# Patient Record
Sex: MALE | Race: WHITE | NOT HISPANIC OR LATINO | Employment: FULL TIME | URBAN - METROPOLITAN AREA
[De-identification: names, ages, dates, MRNs, and addresses within clinical notes are randomized per-mention and may not be internally consistent; named-entity substitution may affect disease eponyms.]

---

## 2018-01-29 ENCOUNTER — OFFICE VISIT (OUTPATIENT)
Dept: SURGERY | Facility: HOSPITAL | Age: 58
End: 2018-01-29
Payer: COMMERCIAL

## 2018-01-29 VITALS
TEMPERATURE: 99.2 F | WEIGHT: 303.6 LBS | BODY MASS INDEX: 46.01 KG/M2 | DIASTOLIC BLOOD PRESSURE: 100 MMHG | RESPIRATION RATE: 24 BRPM | HEIGHT: 68 IN | SYSTOLIC BLOOD PRESSURE: 154 MMHG | HEART RATE: 80 BPM

## 2018-01-29 DIAGNOSIS — L91.8 SKIN TAGS, MULTIPLE ACQUIRED: ICD-10-CM

## 2018-01-29 DIAGNOSIS — E66.01 MORBID OBESITY (HCC): ICD-10-CM

## 2018-01-29 DIAGNOSIS — L21.9 SEBORRHEA: ICD-10-CM

## 2018-01-29 DIAGNOSIS — Z12.11 ENCOUNTER FOR SCREENING COLONOSCOPY: ICD-10-CM

## 2018-01-29 DIAGNOSIS — D18.01 HEMANGIOMA OF SKIN: ICD-10-CM

## 2018-01-29 DIAGNOSIS — D22.9 MULTIPLE PIGMENTED NEVI: ICD-10-CM

## 2018-01-29 DIAGNOSIS — K42.9 RECURRENT UMBILICAL HERNIA: Primary | ICD-10-CM

## 2018-01-29 PROCEDURE — 99244 OFF/OP CNSLTJ NEW/EST MOD 40: CPT | Performed by: SURGERY

## 2018-01-29 NOTE — PROGRESS NOTES
[address]  [place]  [address]  1960  [phone]  [address]  [facility]GAR[facility] [address]    Chief Complaint   Patient presents with    Hernia     New patient consult to evaluate umbilical hernia       Assessment/Plan      No history exists  History of Present Illness: Clinton Woodard is a 56year old male who presents today, per referral by Dr Abdoulaye Arzate, for consultation regarding a possible umbilical hernia  He reports this is a recurrent hernia  He had an umbilical hernia repair with mesh approximately 10 years ago done by Dr David Denton  He noticed symptoms a couple months ago  It was reducible then, but he reports it is not reducible now  It causes him pain  He reports he has more bowel movements each day  He is morbidly obese with a BMI of 46 16  He reports he has lost 15 pounds  He lost 100 pounds with a medication a few years ago but has since gained it back  He has never had a colonoscopy  He states he has high blood pressure and takes Hyzaar for it  He denies any other past medical or surgical history  Review of Systems   Constitutional: Negative  HENT: Negative  Eyes: Negative  Respiratory: Negative  Cardiovascular: Negative  Gastrointestinal: Positive for abdominal pain  Endocrine: Negative  Genitourinary: Negative  Musculoskeletal: Negative  Skin: Negative  Allergic/Immunologic: Negative  Neurological: Negative  Hematological: Negative  Psychiatric/Behavioral: Negative  All other systems reviewed and are negative  There is no problem list on file for this patient  No past medical history on file  No past surgical history on file  No family history on file    Social History     Social History    Marital status: /Civil Union Spouse name: N/A    Number of children: N/A    Years of education: N/A     Occupational History    Not on file  Social History Main Topics    Smoking status: Never Smoker    Smokeless tobacco: Never Used    Alcohol use No    Drug use: No    Sexual activity: Not on file     Other Topics Concern    Not on file     Social History Narrative    No narrative on file     No current outpatient prescriptions on file  Allergies not on file  Vitals:    01/29/18 1358   BP: 154/100   Pulse: 80   Resp: (!) 24   Temp: 99 2 °F (37 3 °C)       Physical Exam   Constitutional: He is oriented to person, place, and time  He appears well-developed and well-nourished  No distress  Morbidly obese  HENT:   Head: Normocephalic and atraumatic  Right Ear: External ear normal    Left Ear: External ear normal    Nose: Nose normal    Mouth/Throat: Oropharynx is clear and moist  No oropharyngeal exudate  Eyes: Conjunctivae and EOM are normal  Right eye exhibits no discharge  Left eye exhibits no discharge  No scleral icterus  Neck: Normal range of motion  Neck supple  No JVD present  No tracheal deviation present  No thyromegaly present  Cardiovascular: Normal rate, regular rhythm, normal heart sounds and intact distal pulses  Exam reveals no gallop and no friction rub  No murmur heard  Pulmonary/Chest: Effort normal and breath sounds normal  No stridor  No respiratory distress  He has no wheezes  He has no rales  He exhibits no tenderness  Abdominal: Soft  Bowel sounds are normal  He exhibits no distension and no mass  There is no tenderness  There is no rebound and no guarding  Non-reducible umbilical hernia  Large midline scar  Musculoskeletal: Normal range of motion  He exhibits no edema, tenderness or deformity  Lymphadenopathy:     He has no cervical adenopathy  Neurological: He is alert and oriented to person, place, and time  No cranial nerve deficit  Coordination normal    Skin: Skin is dry   No rash noted  He is not diaphoretic  No erythema  No pallor  Multiple skin tags of neck and back  Pigmented nevi, scattered hemangiomas of back  Seborrhea of upper extremities  Psychiatric: He has a normal mood and affect  His behavior is normal  Thought content normal    Nursing note and vitals reviewed  Pathology:  * Cannot find OR log *      Labs:  CBC, Coags, BMP, Mg, Phos   Liver Function, Amylase, & Lipase   Cardiac Enzymes   CSF & Dilantin   ABGs     Imaging  No results found  I reviewed the above laboratory and imaging data  Discussion/Summary: Koko Levin is a 56year old male who presents today, per referral by Dr Lupillo García, for consultation regarding a possible umbilical hernia  Physical exam revealed a non-reducible umbilical hernia  Discussed his weight and weight loss  He understands that his risk of recurrence for a recurrent umbilical hernia repair with his weight risk factor is 50%  Explained that it would be best for him to lose weight before hernia surgery  He could have it done now, but the recurrence risk is high  Discussed gastric bypass surgery as a possible option  Will refer him to Bariatric surgery  He will follow up with us afterwards  He understands the symptoms of incarceration  He will call back if he has any questions or concerns  Skin- Instructed him to have an annual skin exam performed by his PCP or a dermatologist for monitoring of his multiple skin tags of neck and back, pigmented nevi and scattered hemangiomas of back,and seborrhea of upper extremities  Colonoscopy- Recommended he have a colonoscopy

## 2019-10-16 ENCOUNTER — APPOINTMENT (EMERGENCY)
Dept: RADIOLOGY | Facility: HOSPITAL | Age: 59
End: 2019-10-16

## 2019-10-16 ENCOUNTER — HOSPITAL ENCOUNTER (EMERGENCY)
Facility: HOSPITAL | Age: 59
Discharge: HOME | End: 2019-10-16
Attending: EMERGENCY MEDICINE | Admitting: EMERGENCY MEDICINE

## 2019-10-16 VITALS
DIASTOLIC BLOOD PRESSURE: 88 MMHG | WEIGHT: 300 LBS | OXYGEN SATURATION: 99 % | BODY MASS INDEX: 45.61 KG/M2 | RESPIRATION RATE: 22 BRPM | TEMPERATURE: 97.2 F | HEART RATE: 77 BPM | SYSTOLIC BLOOD PRESSURE: 185 MMHG

## 2019-10-16 DIAGNOSIS — S93.601A SPRAIN OF RIGHT FOOT, INITIAL ENCOUNTER: Primary | ICD-10-CM

## 2019-10-16 PROCEDURE — 99282 EMERGENCY DEPT VISIT SF MDM: CPT | Performed by: PHYSICIAN ASSISTANT

## 2019-10-16 PROCEDURE — 73630 X-RAY EXAM OF FOOT: CPT

## 2019-10-16 PROCEDURE — 99283 EMERGENCY DEPT VISIT LOW MDM: CPT

## 2019-10-16 NOTE — ED PROVIDER NOTES
History  Chief Complaint   Patient presents with    Foot Injury     To ED with c/o pain in right foot after twisting it Monday while getting out of the truck,     65 yo male w/ hx of HTN presents to the Emergency Department for evaluation of R foot pain x 2 days  States when he attempted to get out of his truck he stepped on a rock, causing an inversion of the foot  Unable to localize pain, simply states "the whole foot hurts " No ankle or knee pain  Able to bear weight with pain  None       Past Medical History:   Diagnosis Date    Hypertension        Past Surgical History:   Procedure Laterality Date    HERNIA REPAIR      Umbilical hernia repair with mesh       History reviewed  No pertinent family history  I have reviewed and agree with the history as documented  Social History     Tobacco Use    Smoking status: Never Smoker    Smokeless tobacco: Never Used   Substance Use Topics    Alcohol use: No    Drug use: No        Review of Systems   Constitutional: Negative for fever  Musculoskeletal: Positive for arthralgias  Negative for joint swelling and myalgias  Skin: Negative for color change and wound  Neurological: Negative for weakness and numbness  Physical Exam  Physical Exam   Constitutional: He is oriented to person, place, and time  He appears well-developed and well-nourished  No distress  HENT:   Head: Normocephalic and atraumatic  Mouth/Throat: Oropharynx is clear and moist    Eyes: Pupils are equal, round, and reactive to light  No scleral icterus  Neck: No JVD present  Cardiovascular: Normal rate and regular rhythm  Exam reveals no gallop and no friction rub  No murmur heard  Pulmonary/Chest: No respiratory distress  He has no wheezes  He has no rales  Musculoskeletal:        Right ankle: He exhibits normal range of motion, no swelling and no ecchymosis   No lateral malleolus, no medial malleolus, no head of 5th metatarsal and no proximal fibula tenderness found         Right foot: There is bony tenderness (non focal, midfoot and plantar midfoot)  There is no crepitus and no deformity  Neurological: He is alert and oriented to person, place, and time  Skin: Skin is warm and dry  He is not diaphoretic  Vitals reviewed  Vital Signs  ED Triage Vitals [10/16/19 1713]   Temperature Pulse Respirations Blood Pressure SpO2   (!) 97 2 °F (36 2 °C) 77 22 (!) 185/88 99 %      Temp Source Heart Rate Source Patient Position - Orthostatic VS BP Location FiO2 (%)   Tympanic Monitor Sitting Right arm --      Pain Score       7           Vitals:    10/16/19 1713   BP: (!) 185/88   Pulse: 77   Patient Position - Orthostatic VS: Sitting         Visual Acuity      ED Medications  Medications - No data to display    Diagnostic Studies  Results Reviewed     None                 XR foot 3+ views RIGHT    (Results Pending)              Procedures  Procedures       ED Course                               MDM  Number of Diagnoses or Management Options  Sprain of right foot, initial encounter: new and requires workup  Diagnosis management comments: XR unremarkable  Pt ambulatory at discharge with antalgic gait       Amount and/or Complexity of Data Reviewed  Tests in the radiology section of CPT®: ordered and reviewed  Review and summarize past medical records: yes  Independent visualization of images, tracings, or specimens: yes        Disposition  Final diagnoses:   Sprain of right foot, initial encounter     Time reflects when diagnosis was documented in both MDM as applicable and the Disposition within this note     Time User Action Codes Description Comment    10/16/2019  5:47 PM Joe Stark Add [K30 240L] Sprain of right foot, initial encounter       ED Disposition     ED Disposition Condition Date/Time Comment    Discharge Stable Wed Oct 16, 2019  5:47 PM Tawny Ford discharge to home/self care              Follow-up Information     Follow up With Specialties Details Why Yousif, DO Internal Medicine In 3 days If symptoms worsen [address] [facility],[address]  [address] [place] [address] Road  [phone]            There are no discharge medications for this patient  No discharge procedures on file      ED Provider  Electronically Signed by           Jose E Colby PA-C  10/16/19 4752

## 2020-02-06 ENCOUNTER — HOSPITAL ENCOUNTER (INPATIENT)
Facility: HOSPITAL | Age: 60
LOS: 4 days | DRG: 065 | End: 2020-02-10
Attending: EMERGENCY MEDICINE | Admitting: FAMILY MEDICINE
Payer: COMMERCIAL

## 2020-02-06 ENCOUNTER — APPOINTMENT (EMERGENCY)
Dept: CT IMAGING | Facility: HOSPITAL | Age: 60
DRG: 065 | End: 2020-02-06
Payer: COMMERCIAL

## 2020-02-06 DIAGNOSIS — R93.0 RIGHT MAXILLARY SINUS OPACIFICATION: ICD-10-CM

## 2020-02-06 DIAGNOSIS — G45.9 TIA (TRANSIENT ISCHEMIC ATTACK): Primary | ICD-10-CM

## 2020-02-06 DIAGNOSIS — I63.9 CEREBROVASCULAR ACCIDENT (CVA), UNSPECIFIED MECHANISM (HCC): ICD-10-CM

## 2020-02-06 DIAGNOSIS — E11.9 TYPE 2 DIABETES MELLITUS WITHOUT COMPLICATION, UNSPECIFIED WHETHER LONG TERM INSULIN USE (HCC): Chronic | ICD-10-CM

## 2020-02-06 LAB
ALBUMIN SERPL BCP-MCNC: 4.1 G/DL (ref 3.5–5)
ALP SERPL-CCNC: 57 U/L (ref 46–116)
ALT SERPL W P-5'-P-CCNC: 51 U/L (ref 12–78)
ANION GAP SERPL CALCULATED.3IONS-SCNC: 13 MMOL/L (ref 4–13)
AST SERPL W P-5'-P-CCNC: 36 U/L (ref 5–45)
BASOPHILS # BLD AUTO: 0.1 THOUSANDS/ΜL (ref 0–0.1)
BASOPHILS NFR BLD AUTO: 1 % (ref 0–1)
BILIRUB SERPL-MCNC: 1.2 MG/DL (ref 0.2–1)
BUN SERPL-MCNC: 25 MG/DL (ref 5–25)
CALCIUM SERPL-MCNC: 9.6 MG/DL (ref 8.3–10.1)
CHLORIDE SERPL-SCNC: 100 MMOL/L (ref 100–108)
CO2 SERPL-SCNC: 25 MMOL/L (ref 21–32)
CREAT SERPL-MCNC: 1.29 MG/DL (ref 0.6–1.3)
EOSINOPHIL # BLD AUTO: 0.25 THOUSAND/ΜL (ref 0–0.61)
EOSINOPHIL NFR BLD AUTO: 2 % (ref 0–6)
ERYTHROCYTE [DISTWIDTH] IN BLOOD BY AUTOMATED COUNT: 13.4 % (ref 11.6–15.1)
GFR SERPL CREATININE-BSD FRML MDRD: 61 ML/MIN/1.73SQ M
GLUCOSE SERPL-MCNC: 155 MG/DL (ref 65–140)
HCT VFR BLD AUTO: 54.1 % (ref 36.5–49.3)
HGB BLD-MCNC: 17.9 G/DL (ref 12–17)
IMM GRANULOCYTES # BLD AUTO: 0.1 THOUSAND/UL (ref 0–0.2)
IMM GRANULOCYTES NFR BLD AUTO: 1 % (ref 0–2)
LYMPHOCYTES # BLD AUTO: 3.05 THOUSANDS/ΜL (ref 0.6–4.47)
LYMPHOCYTES NFR BLD AUTO: 22 % (ref 14–44)
MCH RBC QN AUTO: 29.7 PG (ref 26.8–34.3)
MCHC RBC AUTO-ENTMCNC: 33.1 G/DL (ref 31.4–37.4)
MCV RBC AUTO: 90 FL (ref 82–98)
MONOCYTES # BLD AUTO: 1.04 THOUSAND/ΜL (ref 0.17–1.22)
MONOCYTES NFR BLD AUTO: 8 % (ref 4–12)
NEUTROPHILS # BLD AUTO: 9.06 THOUSANDS/ΜL (ref 1.85–7.62)
NEUTS SEG NFR BLD AUTO: 66 % (ref 43–75)
NRBC BLD AUTO-RTO: 0 /100 WBCS
PLATELET # BLD AUTO: 297 THOUSANDS/UL (ref 149–390)
PMV BLD AUTO: 10 FL (ref 8.9–12.7)
POTASSIUM SERPL-SCNC: 3.7 MMOL/L (ref 3.5–5.3)
PROT SERPL-MCNC: 9.2 G/DL (ref 6.4–8.2)
RBC # BLD AUTO: 6.02 MILLION/UL (ref 3.88–5.62)
SODIUM SERPL-SCNC: 138 MMOL/L (ref 136–145)
TROPONIN I SERPL-MCNC: <0.02 NG/ML
WBC # BLD AUTO: 13.6 THOUSAND/UL (ref 4.31–10.16)

## 2020-02-06 PROCEDURE — 96360 HYDRATION IV INFUSION INIT: CPT

## 2020-02-06 PROCEDURE — 99285 EMERGENCY DEPT VISIT HI MDM: CPT

## 2020-02-06 PROCEDURE — 70450 CT HEAD/BRAIN W/O DYE: CPT

## 2020-02-06 PROCEDURE — 85025 COMPLETE CBC W/AUTO DIFF WBC: CPT

## 2020-02-06 PROCEDURE — 93005 ELECTROCARDIOGRAM TRACING: CPT

## 2020-02-06 PROCEDURE — 84484 ASSAY OF TROPONIN QUANT: CPT

## 2020-02-06 PROCEDURE — 96361 HYDRATE IV INFUSION ADD-ON: CPT

## 2020-02-06 PROCEDURE — 80053 COMPREHEN METABOLIC PANEL: CPT

## 2020-02-06 PROCEDURE — 36415 COLL VENOUS BLD VENIPUNCTURE: CPT

## 2020-02-06 PROCEDURE — 99285 EMERGENCY DEPT VISIT HI MDM: CPT | Performed by: EMERGENCY MEDICINE

## 2020-02-06 RX ORDER — ASPIRIN 81 MG/1
81 TABLET, CHEWABLE ORAL DAILY
Status: DISCONTINUED | OUTPATIENT
Start: 2020-02-07 | End: 2020-02-10 | Stop reason: HOSPADM

## 2020-02-06 RX ORDER — MECLIZINE HCL 12.5 MG/1
25 TABLET ORAL ONCE
Status: COMPLETED | OUTPATIENT
Start: 2020-02-06 | End: 2020-02-06

## 2020-02-06 RX ORDER — ASPIRIN 325 MG
325 TABLET ORAL ONCE
Status: COMPLETED | OUTPATIENT
Start: 2020-02-06 | End: 2020-02-06

## 2020-02-06 RX ORDER — LORAZEPAM 2 MG/ML
1 INJECTION INTRAMUSCULAR ONCE
Status: COMPLETED | OUTPATIENT
Start: 2020-02-06 | End: 2020-02-06

## 2020-02-06 RX ORDER — ONDANSETRON 2 MG/ML
1 INJECTION INTRAMUSCULAR; INTRAVENOUS ONCE
Status: COMPLETED | OUTPATIENT
Start: 2020-02-06 | End: 2020-02-06

## 2020-02-06 RX ADMIN — MECLIZINE 25 MG: 12.5 TABLET ORAL at 19:47

## 2020-02-06 RX ADMIN — ASPIRIN 325 MG ORAL TABLET 325 MG: 325 PILL ORAL at 23:17

## 2020-02-06 RX ADMIN — SODIUM CHLORIDE 1000 ML: 0.9 INJECTION, SOLUTION INTRAVENOUS at 19:47

## 2020-02-07 ENCOUNTER — APPOINTMENT (INPATIENT)
Dept: NON INVASIVE DIAGNOSTICS | Facility: HOSPITAL | Age: 60
DRG: 065 | End: 2020-02-07
Payer: COMMERCIAL

## 2020-02-07 ENCOUNTER — APPOINTMENT (INPATIENT)
Dept: MRI IMAGING | Facility: HOSPITAL | Age: 60
DRG: 065 | End: 2020-02-07
Payer: COMMERCIAL

## 2020-02-07 ENCOUNTER — APPOINTMENT (INPATIENT)
Dept: CT IMAGING | Facility: HOSPITAL | Age: 60
DRG: 065 | End: 2020-02-07
Payer: COMMERCIAL

## 2020-02-07 PROBLEM — I10 HYPERTENSION: Status: ACTIVE | Noted: 2020-02-07

## 2020-02-07 PROBLEM — E66.01 MORBID OBESITY WITH BODY MASS INDEX OF 40.0-49.9 (HCC): Status: ACTIVE | Noted: 2020-02-07

## 2020-02-07 PROBLEM — R42 DIZZINESS: Status: ACTIVE | Noted: 2020-02-07

## 2020-02-07 PROBLEM — I63.9 STROKE (HCC): Status: ACTIVE | Noted: 2020-02-07

## 2020-02-07 PROBLEM — G45.9 TIA (TRANSIENT ISCHEMIC ATTACK): Status: ACTIVE | Noted: 2020-02-07

## 2020-02-07 PROBLEM — E11.9 DIABETES MELLITUS (HCC): Status: ACTIVE | Noted: 2020-02-07

## 2020-02-07 LAB
ANION GAP SERPL CALCULATED.3IONS-SCNC: 9 MMOL/L (ref 4–13)
ATRIAL RATE: 82 BPM
BASOPHILS # BLD AUTO: 0.08 THOUSANDS/ΜL (ref 0–0.1)
BASOPHILS NFR BLD AUTO: 1 % (ref 0–1)
BUN SERPL-MCNC: 24 MG/DL (ref 5–25)
CALCIUM SERPL-MCNC: 8.6 MG/DL (ref 8.3–10.1)
CHLORIDE SERPL-SCNC: 102 MMOL/L (ref 100–108)
CHOLEST SERPL-MCNC: 146 MG/DL (ref 50–200)
CO2 SERPL-SCNC: 27 MMOL/L (ref 21–32)
CREAT SERPL-MCNC: 1.1 MG/DL (ref 0.6–1.3)
EOSINOPHIL # BLD AUTO: 0.16 THOUSAND/ΜL (ref 0–0.61)
EOSINOPHIL NFR BLD AUTO: 2 % (ref 0–6)
ERYTHROCYTE [DISTWIDTH] IN BLOOD BY AUTOMATED COUNT: 13.6 % (ref 11.6–15.1)
EST. AVERAGE GLUCOSE BLD GHB EST-MCNC: 203 MG/DL
GFR SERPL CREATININE-BSD FRML MDRD: 74 ML/MIN/1.73SQ M
GLUCOSE SERPL-MCNC: 123 MG/DL (ref 65–140)
GLUCOSE SERPL-MCNC: 135 MG/DL (ref 65–140)
GLUCOSE SERPL-MCNC: 173 MG/DL (ref 65–140)
GLUCOSE SERPL-MCNC: 205 MG/DL (ref 65–140)
HBA1C MFR BLD: 8.7 % (ref 4.2–6.3)
HCT VFR BLD AUTO: 49.4 % (ref 36.5–49.3)
HDLC SERPL-MCNC: 43 MG/DL
HGB BLD-MCNC: 16.3 G/DL (ref 12–17)
IMM GRANULOCYTES # BLD AUTO: 0.05 THOUSAND/UL (ref 0–0.2)
IMM GRANULOCYTES NFR BLD AUTO: 1 % (ref 0–2)
LDLC SERPL CALC-MCNC: 78 MG/DL (ref 0–100)
LYMPHOCYTES # BLD AUTO: 2.16 THOUSANDS/ΜL (ref 0.6–4.47)
LYMPHOCYTES NFR BLD AUTO: 20 % (ref 14–44)
MAGNESIUM SERPL-MCNC: 1.8 MG/DL (ref 1.6–2.6)
MCH RBC QN AUTO: 30.1 PG (ref 26.8–34.3)
MCHC RBC AUTO-ENTMCNC: 33 G/DL (ref 31.4–37.4)
MCV RBC AUTO: 91 FL (ref 82–98)
MONOCYTES # BLD AUTO: 0.77 THOUSAND/ΜL (ref 0.17–1.22)
MONOCYTES NFR BLD AUTO: 7 % (ref 4–12)
NEUTROPHILS # BLD AUTO: 7.77 THOUSANDS/ΜL (ref 1.85–7.62)
NEUTS SEG NFR BLD AUTO: 69 % (ref 43–75)
NRBC BLD AUTO-RTO: 0 /100 WBCS
P AXIS: 53 DEGREES
PHOSPHATE SERPL-MCNC: 2.6 MG/DL (ref 2.7–4.5)
PLATELET # BLD AUTO: 230 THOUSANDS/UL (ref 149–390)
PMV BLD AUTO: 9.7 FL (ref 8.9–12.7)
POTASSIUM SERPL-SCNC: 4 MMOL/L (ref 3.5–5.3)
PR INTERVAL: 188 MS
QRS AXIS: -72 DEGREES
QRSD INTERVAL: 114 MS
QT INTERVAL: 404 MS
QTC INTERVAL: 472 MS
RBC # BLD AUTO: 5.42 MILLION/UL (ref 3.88–5.62)
SODIUM SERPL-SCNC: 138 MMOL/L (ref 136–145)
T WAVE AXIS: 85 DEGREES
TRIGL SERPL-MCNC: 123 MG/DL
VENTRICULAR RATE: 82 BPM
WBC # BLD AUTO: 10.99 THOUSAND/UL (ref 4.31–10.16)

## 2020-02-07 PROCEDURE — 80061 LIPID PANEL: CPT | Performed by: PHYSICIAN ASSISTANT

## 2020-02-07 PROCEDURE — 70498 CT ANGIOGRAPHY NECK: CPT

## 2020-02-07 PROCEDURE — 97163 PT EVAL HIGH COMPLEX 45 MIN: CPT

## 2020-02-07 PROCEDURE — 97166 OT EVAL MOD COMPLEX 45 MIN: CPT

## 2020-02-07 PROCEDURE — 82948 REAGENT STRIP/BLOOD GLUCOSE: CPT

## 2020-02-07 PROCEDURE — 93306 TTE W/DOPPLER COMPLETE: CPT | Performed by: INTERNAL MEDICINE

## 2020-02-07 PROCEDURE — 99233 SBSQ HOSP IP/OBS HIGH 50: CPT | Performed by: NURSE PRACTITIONER

## 2020-02-07 PROCEDURE — 70551 MRI BRAIN STEM W/O DYE: CPT

## 2020-02-07 PROCEDURE — 93010 ELECTROCARDIOGRAM REPORT: CPT | Performed by: INTERNAL MEDICINE

## 2020-02-07 PROCEDURE — 93306 TTE W/DOPPLER COMPLETE: CPT

## 2020-02-07 PROCEDURE — 99223 1ST HOSP IP/OBS HIGH 75: CPT | Performed by: INTERNAL MEDICINE

## 2020-02-07 PROCEDURE — 83735 ASSAY OF MAGNESIUM: CPT | Performed by: PHYSICIAN ASSISTANT

## 2020-02-07 PROCEDURE — 99255 IP/OBS CONSLTJ NEW/EST HI 80: CPT | Performed by: PSYCHIATRY & NEUROLOGY

## 2020-02-07 PROCEDURE — 83036 HEMOGLOBIN GLYCOSYLATED A1C: CPT | Performed by: PHYSICIAN ASSISTANT

## 2020-02-07 PROCEDURE — 70496 CT ANGIOGRAPHY HEAD: CPT

## 2020-02-07 PROCEDURE — 80048 BASIC METABOLIC PNL TOTAL CA: CPT | Performed by: PHYSICIAN ASSISTANT

## 2020-02-07 PROCEDURE — 85025 COMPLETE CBC W/AUTO DIFF WBC: CPT | Performed by: PHYSICIAN ASSISTANT

## 2020-02-07 PROCEDURE — 84100 ASSAY OF PHOSPHORUS: CPT | Performed by: PHYSICIAN ASSISTANT

## 2020-02-07 RX ORDER — CHLORHEXIDINE GLUCONATE 0.12 MG/ML
15 RINSE ORAL EVERY 12 HOURS SCHEDULED
Status: DISCONTINUED | OUTPATIENT
Start: 2020-02-07 | End: 2020-02-08

## 2020-02-07 RX ORDER — AMLODIPINE BESYLATE 5 MG/1
10 TABLET ORAL DAILY
Status: DISCONTINUED | OUTPATIENT
Start: 2020-02-08 | End: 2020-02-10 | Stop reason: HOSPADM

## 2020-02-07 RX ORDER — BUPROPION HYDROCHLORIDE 300 MG/1
300 TABLET ORAL DAILY
Status: DISCONTINUED | OUTPATIENT
Start: 2020-02-07 | End: 2020-02-10 | Stop reason: HOSPADM

## 2020-02-07 RX ORDER — HYDROCHLOROTHIAZIDE 25 MG/1
25 TABLET ORAL DAILY
Status: DISCONTINUED | OUTPATIENT
Start: 2020-02-08 | End: 2020-02-10 | Stop reason: HOSPADM

## 2020-02-07 RX ORDER — LOSARTAN POTASSIUM 25 MG/1
25 TABLET ORAL DAILY
Status: DISCONTINUED | OUTPATIENT
Start: 2020-02-08 | End: 2020-02-10 | Stop reason: HOSPADM

## 2020-02-07 RX ORDER — LORAZEPAM 1 MG/1
1 TABLET ORAL ONCE
Status: COMPLETED | OUTPATIENT
Start: 2020-02-07 | End: 2020-02-07

## 2020-02-07 RX ORDER — ATORVASTATIN CALCIUM 40 MG/1
40 TABLET, FILM COATED ORAL
Status: DISCONTINUED | OUTPATIENT
Start: 2020-02-07 | End: 2020-02-10 | Stop reason: HOSPADM

## 2020-02-07 RX ORDER — HYDROCHLOROTHIAZIDE 25 MG/1
25 TABLET ORAL DAILY
Status: ON HOLD | COMMUNITY
End: 2021-11-04 | Stop reason: SDDI

## 2020-02-07 RX ORDER — MECLIZINE HCL 12.5 MG/1
25 TABLET ORAL EVERY 8 HOURS PRN
Status: DISCONTINUED | OUTPATIENT
Start: 2020-02-07 | End: 2020-02-08

## 2020-02-07 RX ORDER — LOSARTAN POTASSIUM 100 MG/1
25 TABLET ORAL DAILY
Status: ON HOLD | COMMUNITY
End: 2021-11-04 | Stop reason: SDDI

## 2020-02-07 RX ORDER — BUPROPION HYDROCHLORIDE 300 MG/1
300 TABLET ORAL DAILY
Status: ON HOLD | COMMUNITY
End: 2021-11-04 | Stop reason: SDDI

## 2020-02-07 RX ORDER — HYDRALAZINE HYDROCHLORIDE 20 MG/ML
10 INJECTION INTRAMUSCULAR; INTRAVENOUS EVERY 6 HOURS PRN
Status: DISCONTINUED | OUTPATIENT
Start: 2020-02-07 | End: 2020-02-10 | Stop reason: HOSPADM

## 2020-02-07 RX ORDER — AMLODIPINE BESYLATE 10 MG/1
10 TABLET ORAL DAILY
Status: ON HOLD | COMMUNITY
End: 2021-11-04 | Stop reason: SDDI

## 2020-02-07 RX ORDER — ACETAMINOPHEN 325 MG/1
650 TABLET ORAL EVERY 6 HOURS PRN
Status: DISCONTINUED | OUTPATIENT
Start: 2020-02-07 | End: 2020-02-10 | Stop reason: HOSPADM

## 2020-02-07 RX ORDER — ONDANSETRON 2 MG/ML
4 INJECTION INTRAMUSCULAR; INTRAVENOUS EVERY 6 HOURS PRN
Status: DISCONTINUED | OUTPATIENT
Start: 2020-02-07 | End: 2020-02-10 | Stop reason: HOSPADM

## 2020-02-07 RX ADMIN — SERTRALINE HYDROCHLORIDE 50 MG: 50 TABLET ORAL at 11:54

## 2020-02-07 RX ADMIN — PERFLUTREN 1 ML/MIN: 6.52 INJECTION, SUSPENSION INTRAVENOUS at 12:00

## 2020-02-07 RX ADMIN — IOHEXOL 100 ML: 350 INJECTION, SOLUTION INTRAVENOUS at 18:27

## 2020-02-07 RX ADMIN — INSULIN LISPRO 4 UNITS: 100 INJECTION, SOLUTION INTRAVENOUS; SUBCUTANEOUS at 13:12

## 2020-02-07 RX ADMIN — ASPIRIN 81 MG 81 MG: 81 TABLET ORAL at 08:41

## 2020-02-07 RX ADMIN — ACETAMINOPHEN 650 MG: 325 TABLET ORAL at 17:16

## 2020-02-07 RX ADMIN — CHLORHEXIDINE GLUCONATE 0.12% ORAL RINSE 15 ML: 1.2 LIQUID ORAL at 20:06

## 2020-02-07 RX ADMIN — MECLIZINE 25 MG: 12.5 TABLET ORAL at 11:55

## 2020-02-07 RX ADMIN — ENOXAPARIN SODIUM 40 MG: 40 INJECTION SUBCUTANEOUS at 08:41

## 2020-02-07 RX ADMIN — HYDRALAZINE HYDROCHLORIDE 10 MG: 20 INJECTION INTRAMUSCULAR; INTRAVENOUS at 20:06

## 2020-02-07 RX ADMIN — BUPROPION HYDROCHLORIDE 300 MG: 300 TABLET, FILM COATED, EXTENDED RELEASE ORAL at 11:54

## 2020-02-07 RX ADMIN — ATORVASTATIN CALCIUM 40 MG: 40 TABLET, FILM COATED ORAL at 16:35

## 2020-02-07 RX ADMIN — LORAZEPAM 1 MG: 1 TABLET ORAL at 13:28

## 2020-02-07 NOTE — SOCIAL WORK
LOS: 1  Patient is not a 30 day re admission or a Medicare Bundled patient  Met with patient to review the discharge planning process including identifying help at home and patient preference for discharge  Patient reports residing alone in a 1st floor apartment with 1 [address]  He is independent of ADL's, using no assistive device  He is employed full time and owns apartment buildings  He reports no POA/AD and is not interested in obtaining information from the CM on POA/AD  Amanda Varela He denies   SNF/BHU/D&A rehab admissions  He uses [employer] for his medication and can afford them  Discussed his need for acute inpatient rehab, [place] [facility] given and he is requesting referrals be sent to [address] [place] [address]  Referrals sent via [facility]  Wait bed availability

## 2020-02-07 NOTE — ED NOTES
Pts mother is requesting to be called with any changes        Stan Carlisle  [phone]     Karely Bone  02/06/20 2890

## 2020-02-07 NOTE — PROGRESS NOTES
Per MD orders patient transferred to ICU, report provided  Belongings packed and sent along with the patient

## 2020-02-07 NOTE — PLAN OF CARE
Problem: Neurological Deficit  Goal: Neurological status is stable or improving  Description  Interventions:  - Monitor and assess patient's level of consciousness, motor function, sensory function, and level of assistance needed for ADLs  - Monitor and report changes from baseline  Collaborate with interdisciplinary team to initiate plan and implement interventions as ordered  - Provide and maintain a safe environment  - Consider seizure precautions  - Consider fall precautions  - Consider aspiration precautions  - Consider bleeding precautions  Outcome: Progressing     Problem: Activity Intolerance/Impaired Mobility  Goal: Mobility/activity is maintained at optimum level for patient  Description  Interventions:  - Assess and monitor patient  barriers to mobility and need for assistive/adaptive devices  - Assess patient's emotional response to limitations  - Collaborate with interdisciplinary team and initiate plans and interventions as ordered  - Encourage independent activity per ability   - Maintain proper body alignment  - Perform active/passive rom as tolerated/ordered  - Plan activities to conserve energy   - Turn patient as appropriate  Outcome: Progressing     Problem: Communication Impairment  Goal: Ability to express needs and understand communication  Description  Assess patient's communication skills and ability to understand information  Patient will demonstrate use of effective communication techniques, alternative methods of communication and understanding even if not able to speak  - Encourage communication and provide alternate methods of communication as needed  - Collaborate with case management/ for discharge needs  - Include patient/family/caregiver in decisions related to communication    Outcome: Progressing     Problem: Potential for Aspiration  Goal: Non-ventilated patient's risk of aspiration is minimized  Description  Assess and monitor vital signs, respiratory status, and labs (WBC)  Monitor for signs of aspiration (tachypnea, cough, rales, wheezing, cyanosis, fever)  - Assess and monitor patient's ability to swallow  - Place patient up in chair to eat if possible  - HOB up at 90 degrees to eat if unable to get patient up into chair   - Supervise patient during oral intake  - Instruct patient/ family to take small bites  - Instruct patient/ family to take small single sips when taking liquids  - Follow patient-specific strategies generated by speech pathologist   Outcome: Progressing  Goal: Ventilated patient's risk of aspiration is minimized  Description  Assess and monitor vital signs, respiratory status, airway cuff pressure, and labs (WBC)  Monitor for signs of aspiration (tachypnea, cough, rales, wheezing, cyanosis, fever)  - Elevate head of bed 30 degrees if patient has tube feeding   - Monitor tube feeding  Outcome: Progressing     Problem: Nutrition  Goal: Nutrition/Hydration status is improving  Description  Monitor and assess patient's nutrition/hydration status for malnutrition (ex- brittle hair, bruises, dry skin, pale skin and conjunctiva, muscle wasting, smooth red tongue, and disorientation)  Collaborate with interdisciplinary team and initiate plan and interventions as ordered  Monitor patient's weight and dietary intake as ordered or per policy  Utilize nutrition screening tool and intervene per policy  Determine patient's food preferences and provide high-protein, high-caloric foods as appropriate  - Assist patient with eating   - Allow adequate time for meals   - Encourage patient to take dietary supplement as ordered  - Collaborate with clinical nutritionist   - Include patient/family/caregiver in decisions related to nutrition    Outcome: Progressing     Problem: PAIN - ADULT  Goal: Verbalizes/displays adequate comfort level or baseline comfort level  Description  Interventions:  - Encourage patient to monitor pain and request assistance  - Assess pain using appropriate pain scale  - Administer analgesics based on type and severity of pain and evaluate response  - Implement non-pharmacological measures as appropriate and evaluate response  - Consider cultural and social influences on pain and pain management  - Notify physician/advanced practitioner if interventions unsuccessful or patient reports new pain  Outcome: Progressing     Problem: INFECTION - ADULT  Goal: Absence or prevention of progression during hospitalization  Description  INTERVENTIONS:  - Assess and monitor for signs and symptoms of infection  - Monitor lab/diagnostic results  - Monitor all insertion sites, i e  indwelling lines, tubes, and drains  - Monitor endotracheal if appropriate and nasal secretions for changes in amount and color  - [place] appropriate cooling/warming therapies per order  - Administer medications as ordered  - Instruct and encourage patient and family to use good hand hygiene technique  - Identify and instruct in appropriate isolation precautions for identified infection/condition  Outcome: Progressing  Goal: Absence of fever/infection during neutropenic period  Description  INTERVENTIONS:  - Monitor WBC    Outcome: Progressing     Problem: SAFETY ADULT  Goal: Patient will remain free of falls  Description  INTERVENTIONS:  - Assess patient frequently for physical needs  -  Identify cognitive and physical deficits and behaviors that affect risk of falls    -  [place] fall precautions as indicated by assessment   - Educate patient/family on patient safety including physical limitations  - Instruct patient to call for assistance with activity based on assessment  - Modify environment to reduce risk of injury  - Consider OT/PT consult to assist with strengthening/mobility  Outcome: Progressing  Goal: Maintain or return to baseline ADL function  Description  INTERVENTIONS:  -  Assess patient's ability to carry out ADLs; assess patient's baseline for ADL function and identify physical deficits which impact ability to perform ADLs (bathing, care of mouth/teeth, toileting, grooming, dressing, etc )  - Assess/evaluate cause of self-care deficits   - Assess range of motion  - Assess patient's mobility; develop plan if impaired  - Assess patient's need for assistive devices and provide as appropriate  - Encourage maximum independence but intervene and supervise when necessary  - Involve family in performance of ADLs  - Assess for home care needs following discharge   - Consider OT consult to assist with ADL evaluation and planning for discharge  - Provide patient education as appropriate  Outcome: Progressing  Goal: Maintain or return mobility status to optimal level  Description  INTERVENTIONS:  - Assess patient's baseline mobility status (ambulation, transfers, stairs, etc )    - Identify cognitive and physical deficits and behaviors that affect mobility  - Identify mobility aids required to assist with transfers and/or ambulation (gait belt, sit-to-stand, lift, walker, cane, etc )  - [place] fall precautions as indicated by assessment  - Record patient progress and toleration of activity level on Mobility SBAR; progress patient to next Phase/Stage  - Instruct patient to call for assistance with activity based on assessment  - Consider rehabilitation consult to assist with strengthening/weightbearing, etc   Outcome: Progressing     Problem: DISCHARGE PLANNING  Goal: Discharge to home or other facility with appropriate resources  Description  INTERVENTIONS:  - Identify barriers to discharge w/patient and caregiver  - Arrange for needed discharge resources and transportation as appropriate  - Identify discharge learning needs (meds, wound care, etc )  - Arrange for interpretive services to assist at discharge as needed  - Refer to Case Management Department for coordinating discharge planning if the patient needs post-hospital services based on physician/advanced practitioner order or complex needs related to functional status, cognitive ability, or social support system  Outcome: Progressing     Problem: Knowledge Deficit  Goal: Patient/family/caregiver demonstrates understanding of disease process, treatment plan, medications, and discharge instructions  Description  Complete learning assessment and assess knowledge base    Interventions:  - Provide teaching at level of understanding  - Provide teaching via preferred learning methods  Outcome: Progressing     Problem: CARDIOVASCULAR - ADULT  Goal: Maintains optimal cardiac output and hemodynamic stability  Description  INTERVENTIONS:  - Monitor I/O, vital signs and rhythm  - Monitor for S/S and trends of decreased cardiac output  - Administer and titrate ordered vasoactive medications to optimize hemodynamic stability  - Assess quality of pulses, skin color and temperature  - Assess for signs of decreased coronary artery perfusion  - Instruct patient to report change in severity of symptoms  Outcome: Progressing  Goal: Absence of cardiac dysrhythmias or at baseline rhythm  Description  INTERVENTIONS:  - Continuous cardiac monitoring, vital signs, obtain 12 lead EKG if ordered  - Administer antiarrhythmic and heart rate control medications as ordered  - Monitor electrolytes and administer replacement therapy as ordered  Outcome: Progressing

## 2020-02-07 NOTE — UTILIZATION REVIEW
Initial Clinical Review    Admission: Date/Time/Statement: Admission Orders (From admission, onward)     Ordered        02/06/20 2302  Inpatient Admission  Once                   Orders Placed This Encounter   Procedures    Inpatient Admission     Standing Status:   Standing     Number of Occurrences:   1     Order Specific Question:   Admitting Physician     Answer:   Prabha Bowden [[id number]]     Order Specific Question:   Level of Care     Answer:   Med Surg [[id number]]     Order Specific Question:   Estimated length of stay     Answer:   More than 2 Midnights     Order Specific Question:   Certification     Answer:   I certify that inpatient services are medically necessary for this patient for a duration of greater than two midnights  See H&P and MD Progress Notes for additional information about the patient's course of treatment  ED Arrival Information     Expected Arrival Acuity Means of Arrival Escorted By Service Admission Type    - 2/6/2020 18:28 Urgent Ambulance [facility]) Hospitalist Urgent    Arrival Complaint    Dizziness        Chief Complaint   Patient presents with    Dizziness     c/o dizziness and nausea x 2 days  1mg ativan, 4mg zofran via EMS     Assessment/Plan: this is a 64year old male from home to ED via ems admitted to inpatient due to TIA/dizziness  History of hypertension, sinus issues, diabetes  Presented due to dizziness, room spinning sensation, worse when turns head or sits up for last 2 days  Since noon feels as does not have control of left arm  Did not take BP medications due to dizziness  Ems administered Zofran and ativan without effect on dizziness  On exam has bilateral nystagmus  Coordination abnormal LUE with finger to nose exam  NIH stoke score 1  Not a tpa candidate  Glucose 155     wbc 13 60  H&H 17 9/54  1  CT showed chronic left caudate lacunar infarction  Serial neuro exams, MRI brain in progress    Neurology consulted    On 2/7- continues to feel weak in LUE, dizzy when sits up  when asleep desats to 88 and snores significantly  Valora Lovings continues Lipitor added, Antivert prn  Permissive hypertension  Per neurology on 2/7- examined by Attending neurologist who noted dysconjugate gaze, nystagmus and worsening diplopia  Etiology concern for posterior fossa infarct  Recommend patient be transferred ICU for closer monitoring and  Q1 hour neuro checks    ED Triage Vitals [02/06/20 1833]   Temperature Pulse Respirations Blood Pressure SpO2   99 2 °F (37 3 °C) 88 (!) 30 146/85 91 %      Temp Source Heart Rate Source Patient Position - Orthostatic VS BP Location FiO2 (%)   Temporal Monitor Lying Right arm --      Pain Score       6        Wt Readings from Last 1 Encounters:   02/07/20 136 kg (299 lb)     Additional Vital Signs:  02/07/20 0800  98 °F (36 7 °C)  75  18  156/104Abnormal     93 %    Lying   02/07/20 0600  98 4 °F (36 9 °C)    19  110/104Abnormal       Nasal cannula  Lying   02/07/20 0400  98 6 °F (37 °C)  87  19  121/74        Lying   02/07/20 0300  98 5 °F (36 9 °C)    20  119/72        Lying   02/07/20 0200  98 4 °F (36 9 °C)    22  132/80        Lying   02/07/20 0100  98 7 °F (37 1 °C)    23Abnormal   147/91      None (Room air)  Lying   02/06/20 23:51:05  98 3 °F (36 8 °C)  90  22  155/94  114  93 %       02/06/20 2230    80  21  156/81    91 %  None (Room air)  Lying   02/06/20 2100    87  23Abnormal   182/87Abnormal     93 %  None (Room air)       Date and Time Eye Opening Best Verbal Response Best Motor Response [place] Coma Scale Score   02/07/20 1000 4 5 6 15   02/07/20 0800 4 5 6 15   02/07/20 0100 4 5 6 15   02/07/20 0000 4 5 6 15   02/06/20 1841 4 5 6 15     Pertinent Labs/Diagnostic Test Results:   2/6/2020 CT head - No acute intracranial abnormality   Findings most consistent with precocious microangiopathic change and chronic left caudate head lacunar infarctions    2/6/2020 EKG- sinus  LA[facility]    Normal ST and T  Results from last 7 days   Lab Units 02/07/20  0444 02/06/20  1843   WBC Thousand/uL 10 99* 13 60*   HEMOGLOBIN g/dL 16 3 17 9*   HEMATOCRIT % 49 4* 54 1*   PLATELETS Thousands/uL 230 297   NEUTROS ABS Thousands/µL 7 77* 9 06*         Results from last 7 days   Lab Units 02/07/20  0444 02/06/20  1843   SODIUM mmol/L 138 138   POTASSIUM mmol/L 4 0 3 7   CHLORIDE mmol/L 102 100   CO2 mmol/L 27 25   ANION GAP mmol/L 9 13   BUN mg/dL 24 25   CREATININE mg/dL 1 10 1 29   EGFR ml/min/1 73sq m 74 61   CALCIUM mg/dL 8 6 9 6   MAGNESIUM mg/dL 1 8  --    PHOSPHORUS mg/dL 2 6*  --      Results from last 7 days   Lab Units 02/06/20  1843   AST U/L 36   ALT U/L 51   ALK PHOS U/L 57   TOTAL PROTEIN g/dL 9 2*   ALBUMIN g/dL 4 1   TOTAL BILIRUBIN mg/dL 1 20*     Results from last 7 days   Lab Units 02/07/20  0444 02/06/20  1843   GLUCOSE RANDOM mg/dL 173* 155*     Results from last 7 days   Lab Units 02/07/20  0444   HEMOGLOBIN A1C % 8 7*   EAG mg/dl 203     Results from last 7 days   Lab Units 02/06/20  1843   TROPONIN I ng/mL <0 02     ED Treatment:   Medication Administration from 02/06/2020 1828 to 02/06/2020 2331       Date/Time Order Dose Route Action Action by Comments     02/06/2020 1904 LORazepam (FOR EMS ONLY) (ATIVAN) 2 mg/mL injection 2 mg 0 mg Does not apply Given to EMS Priyanka Byrnes RN      02/06/2020 1904 ondansetron (FOR EMS ONLY) (ZOFRAN) 4 mg/2 mL injection 4 mg 0 mg Does not apply Given to 66 Michela San RN      02/06/2020 1947 sodium chloride 0 9 % bolus 1,000 mL 1,000 mL Intravenous New Bag Genaro Kauffman RN      02/06/2020 1947 meclizine (ANTIVERT) tablet 25 mg 25 mg Oral Given Genaro Kauffman RN      02/06/2020 2317 aspirin tablet 325 mg 325 mg Oral Given Genaro Kauffman RN         Past Medical History:   Diagnosis Date    Diabetes mellitus ([address] )     Diverticulitis of intestine     Hypertension      Present on Admission:   Dizziness   TIA (transient ischemic attack)   Hypertension      Admitting Diagnosis: TIA (transient ischemic attack) [G45 9]  Dizziness [R42]  Age/Sex: 64 y o  male  Admission Orders: 2/6/2020 2302 Inpatient   Scheduled Medications:  Medications:  [START ON 2/8/2020] amLODIPine 10 mg Oral Daily   aspirin 81 mg Oral Daily   atorvastatin 40 mg Oral Daily With Dinner   buPROPion 300 mg Oral Daily   enoxaparin 40 mg Subcutaneous Daily   [START ON 2/8/2020] hydrochlorothiazide 25 mg Oral Daily   insulin lispro 2-12 Units Subcutaneous TID AC   [START ON 2/8/2020] losartan 25 mg Oral Daily   sertraline 50 mg Oral Daily     Continuous IV Infusions: none     PRN Meds:  acetaminophen 650 mg Oral Q6H PRN   hydrALAZINE 10 mg Intravenous Q6H PRN   Meclizine - used x 1  25 mg Oral Q8H PRN   ondansetron 4 mg Intravenous Q6H PRN       IP CONSULT TO PHYSICAL MEDICINE REHAB  IP CONSULT TO NEUROLOGY    telemetry      Network Utilization Review Department  [email or web address]  ATTENTION: Please call with any questions or concerns to [phone] and carefully listen to the prompts so that you are directed to the right person  All voicemails are confidential   [place] all requests for admission clinical reviews, approved or denied determinations and any other requests to dedicated fax number below belonging to the campus where the patient is receiving treatment   List of dedicated fax numbers for the Facilities:  FACILITY NAME UR FAX NUMBER   ADMISSION DENIALS (Administrative/Medical Necessity) [phone]   [address]  (Maternity/NICU/Pediatrics) [phone]   [facility] [phone]   Jennifer Enamorado [phone]   [facility] [facility] [address] [facility] [address] [facility] [phone]

## 2020-02-07 NOTE — CONSULTS
Consultation - Neurology   Ashanti Bay 64 y o  male MRN: [id number]  Unit/Bed#: -01 Encounter: [id number]      Assessment/Plan   Assessment:  63 yo male with HTN, DM type 2 and DELANO who presents with dizziness and nausea x 2 days and LUE weakness starting at 12 pm  CT Head with no acute intracranial pathology but demonstrating chronic left lacunar infarct  On exam, patient with vertigo, diplopia, and LUE ataxia    Patient then examined by Attending neurologist who noted dysconjugate gaze, nystagmus and worsening diplopia  Etiology concern for posterior fossa infarct  Recommend patient be transferred ICU for closer monitoring and  Q1 hour neuro checks  Plan:  - MRI Brain wo contrast completed with report pending  - recommend CTA head and neck pending  - Echocardiogram completed with report pending  - telemetry monitoring for arrhythmia  - secondary stroke prevention   - continue ASA 81 mg and Lipitor 40 mg daily   - permissive hypertension; avoid hypotension   - recommend euglycemia and normothermia  - PT/OT/SL   - monitor neuro exam and notify with changes  - please see Attending Neurologist Attestation for further recommendations      Results reviewed:  - labs:  LDL 70, HDL 43, triglycerides 123, Phos 2 6, HgA1c 8 7   - CT Head wo contrast: per report: No acute intracranial abnormality  Findings most consistent with precocious microangiopathic change and chronic left caudate head lacunar infarctions    History of Present Illness     Reason for Consult / Principal Problem: TIA  Hx and PE limited by: none  HPI: Ashanti Bay is a 64 y o   male with HTN, DELANO and DM type 2 who present ED on 2/6/20 with dizziness and nausea x 2 days and acute onset of left upper extremity weakness starting at 12 pm with resolution prior to arrival  Patient also noting recent left ear infection and sinus congestion  In ED, /85, HR 88, SpO2 91% on room air and low grade temp 99 2F        Per patient he started with room spinning dizziness on Wednesday morning at approximately 9 am when he was at work  He went home and rested  Dizziness seemed to persist into yesterday so he came in for further evaluation  He also notes difficulty controlling his LUE but denies any numbness/tingling/weakness  Today, he continues with vertigo  He states it is present at rest, worsens when turning his head to the right and is severe when sitting up  He also notes some diplopia when looking to left, a mild headache on the top of his head and occasional nausea but no vomiting, tinnitus, swallowing or speech difficulties, numbness/tingling/weakness of his face or extremities, SOB, chest pain, palpitations, or voiding difficulties  Inpatient consult to Neurology  Consult performed by: CHRISTINA Ellis  Consult ordered by: Alejandra Justice PA-C          Review of Systems   Constitutional: Negative  HENT: Negative  Eyes: Positive for visual disturbance (diplopia)  Respiratory: Positive for shortness of breath (with exertion)  Cardiovascular: Negative  Gastrointestinal: Positive for nausea  Genitourinary: Negative  Musculoskeletal: Negative  Skin: Negative  Neurological: Positive for dizziness and headaches (top of head)  Negative for weakness and numbness  LUE clumsiness "can't control where it goes"   Psychiatric/Behavioral: Negative          Historical Information   Past Medical History:   Diagnosis Date    Diabetes mellitus ([facility] [address] )     Diverticulitis of intestine     Hypertension      Past Surgical History:   Procedure Laterality Date    CHOLECYSTECTOMY      HERNIA REPAIR      Umbilical hernia repair with mesh     Social History   Social History     Substance and Sexual Activity   Alcohol Use No    Alcohol/week: 0 0 standard drinks     Social History     Substance and Sexual Activity   Drug Use No     Social History     Tobacco Use   Smoking Status Never Smoker   Smokeless Tobacco Never Used     Family History: History reviewed  No pertinent family history  Review of previous medical records was  completed  Meds/Allergies   all current active meds have been reviewed and PTA meds:   Prior to Admission Medications   Prescriptions Last Dose Informant Patient Reported? Taking? amLODIPine (NORVASC) 10 mg tablet  Pharmacy (Specify) Yes Yes   Sig: Take 10 mg by mouth daily   buPROPion (WELLBUTRIN XL) 300 mg 24 hr tablet  Pharmacy (Specify) Yes Yes   Sig: Take 300 mg by mouth daily   hydrochlorothiazide (HYDRODIURIL) 25 mg tablet  Pharmacy (Specify) Yes Yes   Sig: Take 25 mg by mouth daily   losartan (COZAAR) 100 MG tablet  Pharmacy (Specify) Yes Yes   Sig: Take 25 mg by mouth daily   metFORMIN (GLUCOPHAGE) 500 mg tablet  Pharmacy (Specify) Yes Yes   Sig: Take 500 mg by mouth daily with breakfast   sertraline (ZOLOFT) 50 mg tablet  Pharmacy (Specify) Yes Yes   Sig: Take 50 mg by mouth daily      Facility-Administered Medications: None       No Known Allergies    Objective   Vitals:Blood pressure 127/91, pulse 91, temperature 98 3 °F (36 8 °C), resp  rate 18, weight 136 kg (299 lb), SpO2 94 %  ,Body mass index is 45 46 kg/m²  Intake/Output Summary (Last 24 hours) at 2/7/2020 1319  Last data filed at 2/7/2020 0800  Gross per 24 hour   Intake 480 ml   Output 200 ml   Net 280 ml       Invasive Devices: Invasive Devices     Peripheral Intravenous Line            Peripheral IV 02/06/20 Left Hand less than 1 day                Physical Exam   Constitutional: He is oriented to person, place, and time  He appears well-developed and well-nourished  No distress  HENT:   Head: Normocephalic and atraumatic  Mouth/Throat: Oropharynx is clear and moist    Eyes: Pupils are equal, round, and reactive to light  Conjunctivae and EOM are normal  Right eye exhibits no discharge  Left eye exhibits no discharge  Neck: Normal range of motion  Cardiovascular: Normal rate, regular rhythm and normal heart sounds  Exam reveals no gallop and no friction rub  No murmur heard  Pulmonary/Chest: Effort normal and breath sounds normal  No respiratory distress  He has no wheezes  He has no rales  He exhibits no tenderness  Abdominal: Soft  Bowel sounds are normal  He exhibits no distension  There is no tenderness  Musculoskeletal: Normal range of motion  He exhibits no edema  Neurological: He is alert and oriented to person, place, and time  He has normal strength  He has an abnormal Finger-Nose-Finger Test (LUE dysmetria) and an abnormal Heel to [employer] (LLE)  Reflex Scores:       Bicep reflexes are 2+ on the right side and 2+ on the left side  Brachioradialis reflexes are 2+ on the right side and 2+ on the left side  Patellar reflexes are 1+ on the right side and 1+ on the left side  Achilles reflexes are 1+ on the right side and 1+ on the left side  Skin: Skin is warm and dry  He is not diaphoretic  Psychiatric: His speech is normal    Vitals reviewed  Neurologic Exam     Mental Status   Oriented to person, place, and time  Oriented to city  Oriented to year, month and day  Attention: normal  Concentration: normal    Speech: speech is normal   Level of consciousness: alert  Knowledge: good  Able to perform simple calculations  Able to name object  Able to read  Able to repeat  Normal comprehension  Cranial Nerves     CN II   Visual fields full to confrontation  CN III, IV, VI   Pupils are equal, round, and reactive to light  Extraocular motions are normal    Right pupil: Size: 2 mm  Shape: regular  Reactivity: brisk  Left pupil: Size: 2 mm  Shape: regular  Reactivity: brisk  Nystagmus: none   Diplopia: right    CN V   Facial sensation intact  CN VII   Facial expression full, symmetric       CN VIII   Hearing: intact    CN IX, X   Palate: symmetric    CN XI   CN XI normal      CN XII   Tongue deviation: none    Motor Exam   Muscle bulk: normal  Overall muscle tone: normal  Right arm pronator drift: absent  Left arm pronator drift: absent    Strength   Strength 5/5 throughout  Sensory Exam   Light touch normal    Vibration normal    Proprioception normal      Gait, Coordination, and Reflexes     Gait  Gait: (from sit to stand patient unsteady when transferring to commode)    Coordination   Finger to nose coordination: abnormal (LUE dysmetria)  Heel to shin coordination: abnormal (LLE)    Tremor   Resting tremor: absent    Reflexes   Right brachioradialis: 2+  Left brachioradialis: 2+  Right biceps: 2+  Left biceps: 2+  Right patellar: 1+  Left patellar: 1+  Right achilles: 1+  Left achilles: 1+  Right plantar: normal  Left plantar: normal      Lab Results:   I have personally reviewed pertinent reports  , CBC:   Results from last 7 days   Lab Units 02/07/20  0444 02/06/20  1843   WBC Thousand/uL 10 99* 13 60*   RBC Million/uL 5 42 6 02*   HEMOGLOBIN g/dL 16 3 17 9*   HEMATOCRIT % 49 4* 54 1*   MCV fL 91 90   PLATELETS Thousands/uL 230 297   , BMP/CMP:   Results from last 7 days   Lab Units 02/07/20  0444 02/06/20  1843   SODIUM mmol/L 138 138   POTASSIUM mmol/L 4 0 3 7   CHLORIDE mmol/L 102 100   CO2 mmol/L 27 25   BUN mg/dL 24 25   CREATININE mg/dL 1 10 1 29   CALCIUM mg/dL 8 6 9 6   AST U/L  --  36   ALT U/L  --  51   ALK PHOS U/L  --  57   EGFR ml/min/1 73sq m 74 61    HgBA1C:   Results from last 7 days   Lab Units 02/07/20  0444   HEMOGLOBIN A1C % 8 7*   Lipid Profile:   Results from last 7 days   Lab Units 02/07/20  0444   HDL mg/dL 43   LDL CALC mg/dL 78   TRIGLYCERIDES mg/dL 123     Imaging Studies: I have personally reviewed pertinent reports  and I have personally reviewed pertinent films in PACS     EKG, Pathology, and Other Studies: I have personally reviewed pertinent reports     and I have personally reviewed pertinent films in PACS     VTE Prophylaxis: Sequential compression device (Venodyne)  and Enoxaparin (Lovenox)    Code Status: Level 1 - Full Code

## 2020-02-07 NOTE — ASSESSMENT & PLAN NOTE
· Allow for permissible HTN   · -180  · Restarted on home medications   · PRN IV hydralazine & labetolol for SBP > 180

## 2020-02-07 NOTE — MALNUTRITION/BMI
This medical record reflects one or more clinical indicators suggestive of malnutrition and/or morbid obesity  BMI Findings:  BMI Classifications: Morbid Obesity 45-49 9     Body mass index is 45 46 kg/m²  Treat with Cardiac, CCD3 diet  See Nutrition note dated 02/07/2020 for additional details  Completed nutrition assessment is viewable in the nutrition documentation

## 2020-02-07 NOTE — ASSESSMENT & PLAN NOTE
Patient had symptoms of dizziness which did not resolve and arm weakness which did resolve concerning for TIA  MRI of the brain  Neurology consult  Echocardiogram  Neuro exam q 4 hours  As of note patient was sleeping admitted secondary exam on him should be evaluated for DELANO he desats into the 88 why he sleeping, and snores significantly

## 2020-02-07 NOTE — H&P
Progress Note - Milan Hampton 1960, 64 y o  male MRN: [id number]    Unit/Bed#: -01 Encounter: [id number]    Primary Care Provider: Charlene Gannon DO   Date and time admitted to hospital: 2/6/2020  6:30 PM        * TIA (transient ischemic attack)  Assessment & Plan  Patient had symptoms of dizziness which did not resolve and arm weakness which did resolve concerning for TIA  MRI of the brain  Neurology consult  Echocardiogram  Neuro exam q 4 hours  As of note patient was sleeping admitted secondary exam on him should be evaluated for DELANO he desats into the 88 why he sleeping, and snores significantly    Hypertension  Assessment & Plan  Patient has a history of hypertension takes Hyzaar        Dizziness  Assessment & Plan  P r n  Antivert  Rule out TIA for dizziness  Patient has a history of congestion and sinus issues could be related to vertigo        VTE Prophylaxis: Enoxaparin (Lovenox)  / sequential compression device   Code Status: level 1  POLST: POLST form is not discussed and not completed at this time  Anticipated Length of Stay:  Patient will be admitted on an Inpatient basis with an anticipated length of stay of  > 2 midnights  Justification for Hospital Stay: dizziness    Total Time for Visit, including Counseling / Coordination of Care: 30 minutes  Greater than 50% of this total time spent on direct patient counseling and coordination of care  Chief Complaint:   Dizziness    History of Present Illness:    Milan Hampton is a 64 y o  male with a history of hypertension, she also has a surgical history of recurrent umbilical hernia, he came in by ambulance with concern of dizziness and room spinning sensation and also problem with his left arm is arm felt really weak and different around noon today which then finally resolved, he had a recent ear infection on the left and some sinus congestion  [place] dizziness is like a spinning sensation and he has the associated nausea  Review of Systems:    Review of Systems   Constitutional: Negative  Negative for appetite change, chills, fatigue and fever  HENT: Negative for drooling, ear pain, facial swelling, rhinorrhea, trouble swallowing and voice change  Eyes: Negative  Negative for pain and redness  Respiratory: Negative  Negative for cough, chest tightness, shortness of breath, wheezing and stridor  Cardiovascular: Negative  Negative for chest pain, palpitations and leg swelling  Gastrointestinal: Negative  Negative for abdominal pain, blood in stool, nausea and vomiting  Endocrine: Negative  Negative for polydipsia, polyphagia and polyuria  Genitourinary: Negative for difficulty urinating, dysuria, flank pain, frequency, hematuria and urgency  Musculoskeletal: Negative  Negative for arthralgias, joint swelling, myalgias, neck pain and neck stiffness  Skin: Negative  Negative for pallor, rash and wound  Allergic/Immunologic: Negative  Negative for environmental allergies, food allergies and immunocompromised state  Neurological: Positive for dizziness, weakness and numbness  Negative for tremors, seizures, syncope, light-headedness and headaches  Hematological: Negative  Does not bruise/bleed easily  Psychiatric/Behavioral: Negative  Negative for agitation, confusion, hallucinations, self-injury and sleep disturbance  The patient is not hyperactive  Past Medical and Surgical History:     Past Medical History:   Diagnosis Date    [facility] mellitus ([facility] [address] )     Diverticulitis of intestine     Hypertension        Past Surgical History:   Procedure Laterality Date    CHOLECYSTECTOMY      HERNIA REPAIR      Umbilical hernia repair with mesh       Meds/Allergies:    Prior to Admission medications    Not on File     I have reviewed home medications with patient personally      Allergies: No Known Allergies    Social History:     Marital Status: /Civil Union   Occupation:   Patient Pre-hospital Living Situation: home  Patient Pre-hospital Level of Mobility: normla  Patient Pre-hospital Diet Restrictions: none  Substance Use History:   Social History     Substance and Sexual Activity   Alcohol Use No    Alcohol/week: 0 0 standard drinks     Social History     Tobacco Use   Smoking Status Never Smoker   Smokeless Tobacco Never Used     Social History     Substance and Sexual Activity   Drug Use No       Family History:    History reviewed  No pertinent family history  Physical Exam:     Vitals:   Blood Pressure: (!) 177/101 (02/06/20 2355)  Pulse: 90 (02/06/20 2351)  Temperature: 98 3 °F (36 8 °C) (02/06/20 2351)  Temp Source: Temporal (02/06/20 8184)  Respirations: 22 (02/06/20 2351)  Weight - Scale: 132 kg (290 lb) (02/06/20 1833)  SpO2: 93 % (02/06/20 2351)    Physical Exam   Constitutional: He is oriented to person, place, and time  He appears well-developed and well-nourished  No distress  HENT:   Head: Normocephalic and atraumatic  Eyes: Pupils are equal, round, and reactive to light  Conjunctivae and EOM are normal  No scleral icterus  Neck: Normal range of motion  Neck supple  No JVD present  No tracheal deviation present  Cardiovascular: Normal rate, regular rhythm, normal heart sounds and intact distal pulses  Exam reveals no friction rub  No murmur heard  Pulmonary/Chest: Effort normal and breath sounds normal  No stridor  No respiratory distress  He has no wheezes  He has no rales  Abdominal: Soft  Bowel sounds are normal    Obese large scar on the abdomen hernia   Musculoskeletal: Normal range of motion  He exhibits no edema  Neurological: He is alert and oriented to person, place, and time  He has normal reflexes  Skin: Skin is warm and dry  He is not diaphoretic  Psychiatric: He has a normal mood and affect  His behavior is normal    Nursing note and vitals reviewed  Additional Data:     Lab Results: I have personally reviewed pertinent reports  Results from last 7 days   Lab Units 02/06/20  1843   WBC Thousand/uL 13 60*   HEMOGLOBIN g/dL 17 9*   HEMATOCRIT % 54 1*   PLATELETS Thousands/uL 297   NEUTROS PCT % 66   LYMPHS PCT % 22   MONOS PCT % 8   EOS PCT % 2     Results from last 7 days   Lab Units 02/06/20  1843   POTASSIUM mmol/L 3 7   CHLORIDE mmol/L 100   CO2 mmol/L 25   BUN mg/dL 25   CREATININE mg/dL 1 29   CALCIUM mg/dL 9 6   ALK PHOS U/L 57   ALT U/L 51   AST U/L 36           Imaging: I have personally reviewed pertinent reports  No results found  EKG, Pathology, and Other Studies Reviewed on Admission:   · EKG: NSR    Epic / Care Everywhere Records Reviewed: Yes     ** Please Note: This note has been constructed using a voice recognition system   **

## 2020-02-07 NOTE — PLAN OF CARE
Problem: Neurological Deficit  Goal: Neurological status is stable or improving  Description  Interventions:  - Monitor and assess patient's level of consciousness, motor function, sensory function, and level of assistance needed for ADLs  - Monitor and report changes from baseline  Collaborate with interdisciplinary team to initiate plan and implement interventions as ordered  - Provide and maintain a safe environment  - Consider seizure precautions  - Consider fall precautions  - Consider aspiration precautions  - Consider bleeding precautions  Outcome: Progressing     Problem: Activity Intolerance/Impaired Mobility  Goal: Mobility/activity is maintained at optimum level for patient  Description  Interventions:  - Assess and monitor patient  barriers to mobility and need for assistive/adaptive devices  - Assess patient's emotional response to limitations  - Collaborate with interdisciplinary team and initiate plans and interventions as ordered  - Encourage independent activity per ability   - Maintain proper body alignment  - Perform active/passive rom as tolerated/ordered  - Plan activities to conserve energy   - Turn patient as appropriate  Outcome: Progressing     Problem: Communication Impairment  Goal: Ability to express needs and understand communication  Description  Assess patient's communication skills and ability to understand information  Patient will demonstrate use of effective communication techniques, alternative methods of communication and understanding even if not able to speak  - Encourage communication and provide alternate methods of communication as needed  - Collaborate with case management/ for discharge needs  - Include patient/family/caregiver in decisions related to communication    Outcome: Progressing     Problem: Potential for Aspiration  Goal: Non-ventilated patient's risk of aspiration is minimized  Description  Assess and monitor vital signs, respiratory status, and labs (WBC)  Monitor for signs of aspiration (tachypnea, cough, rales, wheezing, cyanosis, fever)  - Assess and monitor patient's ability to swallow  - Place patient up in chair to eat if possible  - HOB up at 90 degrees to eat if unable to get patient up into chair   - Supervise patient during oral intake  - Instruct patient/ family to take small bites  - Instruct patient/ family to take small single sips when taking liquids  - Follow patient-specific strategies generated by speech pathologist   Outcome: Progressing  Goal: Ventilated patient's risk of aspiration is minimized  Description  Assess and monitor vital signs, respiratory status, airway cuff pressure, and labs (WBC)  Monitor for signs of aspiration (tachypnea, cough, rales, wheezing, cyanosis, fever)  - Elevate head of bed 30 degrees if patient has tube feeding   - Monitor tube feeding  Outcome: Progressing     Problem: Nutrition  Goal: Nutrition/Hydration status is improving  Description  Monitor and assess patient's nutrition/hydration status for malnutrition (ex- brittle hair, bruises, dry skin, pale skin and conjunctiva, muscle wasting, smooth red tongue, and disorientation)  Collaborate with interdisciplinary team and initiate plan and interventions as ordered  Monitor patient's weight and dietary intake as ordered or per policy  Utilize nutrition screening tool and intervene per policy  Determine patient's food preferences and provide high-protein, high-caloric foods as appropriate  - Assist patient with eating   - Allow adequate time for meals   - Encourage patient to take dietary supplement as ordered  - Collaborate with clinical nutritionist   - Include patient/family/caregiver in decisions related to nutrition    Outcome: Progressing     Problem: PAIN - ADULT  Goal: Verbalizes/displays adequate comfort level or baseline comfort level  Description  Interventions:  - Encourage patient to monitor pain and request assistance  - Assess pain using appropriate pain scale  - Administer analgesics based on type and severity of pain and evaluate response  - Implement non-pharmacological measures as appropriate and evaluate response  - Consider cultural and social influences on pain and pain management  - Notify physician/advanced practitioner if interventions unsuccessful or patient reports new pain  Outcome: Progressing     Problem: INFECTION - ADULT  Goal: Absence or prevention of progression during hospitalization  Description  INTERVENTIONS:  - Assess and monitor for signs and symptoms of infection  - Monitor lab/diagnostic results  - Monitor all insertion sites, i e  indwelling lines, tubes, and drains  - Monitor endotracheal if appropriate and nasal secretions for changes in amount and color  - [place] appropriate cooling/warming therapies per order  - Administer medications as ordered  - Instruct and encourage patient and family to use good hand hygiene technique  - Identify and instruct in appropriate isolation precautions for identified infection/condition  Outcome: Progressing  Goal: Absence of fever/infection during neutropenic period  Description  INTERVENTIONS:  - Monitor WBC    Outcome: Progressing     Problem: SAFETY ADULT  Goal: Patient will remain free of falls  Description  INTERVENTIONS:  - Assess patient frequently for physical needs  -  Identify cognitive and physical deficits and behaviors that affect risk of falls    -  [place] fall precautions as indicated by assessment   - Educate patient/family on patient safety including physical limitations  - Instruct patient to call for assistance with activity based on assessment  - Modify environment to reduce risk of injury  - Consider OT/PT consult to assist with strengthening/mobility  Outcome: Progressing  Goal: Maintain or return to baseline ADL function  Description  INTERVENTIONS:  -  Assess patient's ability to carry out ADLs; assess patient's baseline for ADL function and identify physical deficits which impact ability to perform ADLs (bathing, care of mouth/teeth, toileting, grooming, dressing, etc )  - Assess/evaluate cause of self-care deficits   - Assess range of motion  - Assess patient's mobility; develop plan if impaired  - Assess patient's need for assistive devices and provide as appropriate  - Encourage maximum independence but intervene and supervise when necessary  - Involve family in performance of ADLs  - Assess for home care needs following discharge   - Consider OT consult to assist with ADL evaluation and planning for discharge  - Provide patient education as appropriate  Outcome: Progressing  Goal: Maintain or return mobility status to optimal level  Description  INTERVENTIONS:  - Assess patient's baseline mobility status (ambulation, transfers, stairs, etc )    - Identify cognitive and physical deficits and behaviors that affect mobility  - Identify mobility aids required to assist with transfers and/or ambulation (gait belt, sit-to-stand, lift, walker, cane, etc )  - [place] fall precautions as indicated by assessment  - Record patient progress and toleration of activity level on Mobility SBAR; progress patient to next Phase/Stage  - Instruct patient to call for assistance with activity based on assessment  - Consider rehabilitation consult to assist with strengthening/weightbearing, etc   Outcome: Progressing     Problem: DISCHARGE PLANNING  Goal: Discharge to home or other facility with appropriate resources  Description  INTERVENTIONS:  - Identify barriers to discharge w/patient and caregiver  - Arrange for needed discharge resources and transportation as appropriate  - Identify discharge learning needs (meds, wound care, etc )  - Arrange for interpretive services to assist at discharge as needed  - Refer to Case Management Department for coordinating discharge planning if the patient needs post-hospital services based on physician/advanced practitioner order or complex needs related to functional status, cognitive ability, or social support system  Outcome: Progressing     Problem: Knowledge Deficit  Goal: Patient/family/caregiver demonstrates understanding of disease process, treatment plan, medications, and discharge instructions  Description  Complete learning assessment and assess knowledge base    Interventions:  - Provide teaching at level of understanding  - Provide teaching via preferred learning methods  Outcome: Progressing     Problem: CARDIOVASCULAR - ADULT  Goal: Maintains optimal cardiac output and hemodynamic stability  Description  INTERVENTIONS:  - Monitor I/O, vital signs and rhythm  - Monitor for S/S and trends of decreased cardiac output  - Administer and titrate ordered vasoactive medications to optimize hemodynamic stability  - Assess quality of pulses, skin color and temperature  - Assess for signs of decreased coronary artery perfusion  - Instruct patient to report change in severity of symptoms  Outcome: Progressing  Goal: Absence of cardiac dysrhythmias or at baseline rhythm  Description  INTERVENTIONS:  - Continuous cardiac monitoring, vital signs, obtain 12 lead EKG if ordered  - Administer antiarrhythmic and heart rate control medications as ordered  - Monitor electrolytes and administer replacement therapy as ordered  Outcome: Progressing

## 2020-02-07 NOTE — PLAN OF CARE
Problem: OCCUPATIONAL THERAPY ADULT  Goal: Performs self-care activities at highest level of function for planned discharge setting  See evaluation for individualized goals  Description  Treatment Interventions: ADL retraining, Functional transfer training, Neuromuscular reeducation, Compensatory technique education, Continued evaluation, Patient/family training, Equipment evaluation/education, UE strengthening/ROM          See flowsheet documentation for full assessment, interventions and recommendations  Note:   Limitation: Decreased ADL status, Decreased self-care trans, Decreased high-level ADLs(impaired coordination)  Prognosis: Good  Assessment: Pt is a 64 y o  male seen for OT evaluation at [facility], admitted 2/6/2020 w/ TIA (transient ischemic attack)  OT completed expanded review of pt's medical and social history  Comorbidities affecting pt's functional performance at time of assessment include: HTN, dizziness, and DM  Personal factors affecting pt at time of IE include:difficulty performing ADLS, difficulty performing IADLS  and decreased functional mobility   Prior to admission, pt was living alone in an apt and was independent with ADLs and functional mobility  Upon evaluation, pt presents to OT below baseline due to the following performance deficits: decreased balance, impaired [facility] and impaired [facility]  Pt also limited by dizziness at this time  Pt to benefit from continued skilled OT tx while in the hospital to address deficits as defined above and maximize level of functional independence w ADL's and functional mobility  Occupational Performance areas to address include: bathing/shower, toilet hygiene, dressing, functional mobility, community mobility, clothing management and job performance/volunteering  Based on findings, pt is of moderate complexity  At this time, OT recommendations at time of discharge are short term  OT to follow progress closely       OT Discharge Recommendation: Short Term Rehab

## 2020-02-07 NOTE — ASSESSMENT & PLAN NOTE
· As per MRI brain - Recent ischemia within the inferior left brainstem and brachium pontis  No hemorrhage or significant mass effect  Flow characteristics of the right vertebral artery abnormal   · Current deficits include, HA, dizziness while walking and occasional double vision   · CTA head/neck scheduled as per Neurology   · Neuro checks q1 hr  · Send for STAT imaging with any changes in mentation   · -180  · Neurology following   · Continue ASa & statin   · Stroke pathway   · As per Neurology concern for herniation, may need to start patient on 1 1/2 saline solution if Neuro status worsens  ECHO 2/7 - Systolic function was normal  Ejection fraction was estimated to be 55 %  There was mild concentric hypertrophy  Doppler parameters were consistent with abnormal left ventricular relaxation (grade 1 diastolic dysfunction)

## 2020-02-07 NOTE — SPEECH THERAPY NOTE
Speech Language/Pathology      Consult received  Records reviewed  Pt admitted c symptoms concerning for TIA  Pt passed RN Dysphagia Assessment  Communication deficits denied  NIH score 1  MRI results pending  CT negative for acute intracranial abnormality  No additional inpatient Speech Pathology evaluation appears indicated at this time  Please re-consult if additional concerns arise  Thank you

## 2020-02-07 NOTE — ASSESSMENT & PLAN NOTE
P r n   Antivert  Rule out TIA for dizziness  Patient has a history of congestion and sinus issues could be related to vertigo

## 2020-02-07 NOTE — ED PROVIDER NOTES
History  Chief Complaint   Patient presents with    Dizziness     c/o dizziness and nausea x 2 days  1mg ativan, 4mg zofran via EMS     This is a 65-year-old male brought in by ambulance from home with concern for dizziness room spinning sensation worse when he turns his head or tries to sit up for 2 days now  He has had associated nausea  His friend did not give him his blood pressure medicine because she thought that may be causing the dizziness  He does have some ringing in his left ear and some sinus congestion  He does note that since about noon today his left arm felt different like he did not have control over it  Ambulance crew gave Zofran and Ativan but he still has symptoms  None       Past Medical History:   Diagnosis Date    Diabetes mellitus ([facility] [address] )     Diverticulitis of intestine     Hypertension        Past Surgical History:   Procedure Laterality Date    CHOLECYSTECTOMY      HERNIA REPAIR      Umbilical hernia repair with mesh       History reviewed  No pertinent family history  I have reviewed and agree with the history as documented  Social History     Tobacco Use    Smoking status: Never Smoker    Smokeless tobacco: Never Used   Substance Use Topics    Alcohol use: No     Alcohol/week: 0 0 standard drinks    Drug use: No        Review of Systems   All other systems reviewed and are negative  Physical Exam  Physical Exam   Constitutional: He is oriented to person, place, and time  He appears well-developed and well-nourished  HENT:   Right Ear: Tympanic membrane normal    Left Ear: Tympanic membrane normal    Nose: Right sinus exhibits no maxillary sinus tenderness and no frontal sinus tenderness  Left sinus exhibits no maxillary sinus tenderness and no frontal sinus tenderness  Mouth/Throat: Oropharynx is clear and moist    Eyes: Pupils are equal, round, and reactive to light  Conjunctivae are normal  Right eye exhibits nystagmus  Left eye exhibits nystagmus  Visual fields intact   Neck: Normal range of motion  Neck supple  No spinous process tenderness present  No Brudzinski's sign and no Kernig's sign noted  Cardiovascular: Normal rate, regular rhythm, normal heart sounds and intact distal pulses  Pulmonary/Chest: Effort normal and breath sounds normal  No respiratory distress  He has no wheezes  Abdominal: Soft  Bowel sounds are normal  He exhibits no distension  There is no tenderness  Musculoskeletal: Normal range of motion  Lymphadenopathy:     He has no cervical adenopathy  Neurological: He is alert and oriented to person, place, and time  He has normal strength  No cranial nerve deficit or sensory deficit  Coordination abnormal  GCS eye subscore is 4  GCS verbal subscore is 5  GCS motor subscore is 6  Slight abnormal coordination left upper extremity with finger to nose exam   Skin: Skin is warm and dry  No rash noted  Psychiatric: He has a normal mood and affect  Nursing note and vitals reviewed        Vital Signs  ED Triage Vitals [02/06/20 1833]   Temperature Pulse Respirations Blood Pressure SpO2   99 2 °F (37 3 °C) 88 (!) 30 146/85 91 %      Temp Source Heart Rate Source Patient Position - Orthostatic VS BP Location FiO2 (%)   Temporal Monitor Lying Right arm --      Pain Score       6           Vitals:    02/06/20 2100 02/06/20 2230 02/06/20 2351 02/06/20 2355   BP: (!) 182/87 156/81 155/94 (!) 177/101   Pulse: 87 80 90    Patient Position - Orthostatic VS:  Lying           Visual Acuity  Visual Acuity      Most Recent Value   L Pupil Size (mm)  3   R Pupil Size (mm)  3   L Pupil Shape  Round   R Pupil Shape  Round          ED Medications  Medications   aspirin chewable tablet 81 mg (has no administration in time range)   enoxaparin (LOVENOX) subcutaneous injection 40 mg (has no administration in time range)   LORazepam (FOR EMS ONLY) (ATIVAN) 2 mg/mL injection 2 mg (0 mg Does not apply Given to EMS 2/6/20 1904)   ondansetron (FOR EMS ONLY) (ZOFRAN) 4 mg/2 mL injection 4 mg (0 mg Does not apply Given to EMS 2/6/20 1904)   sodium chloride 0 9 % bolus 1,000 mL (1,000 mL Intravenous New Bag 2/6/20 1947)   meclizine (ANTIVERT) tablet 25 mg (25 mg Oral Given 2/6/20 1947)   aspirin tablet 325 mg (325 mg Oral Given 2/6/20 2317)       Diagnostic Studies  Results Reviewed     Procedure Component Value Units Date/Time    Comprehensive metabolic panel [[id number]]  (Abnormal) Collected:  02/06/20 1843    Lab Status:  Final result Specimen:  Blood from Hand, Left Updated:  02/06/20 1922     Sodium 138 mmol/L      Potassium 3 7 mmol/L      Chloride 100 mmol/L      CO2 25 mmol/L      ANION GAP 13 mmol/L      BUN 25 mg/dL      Creatinine 1 29 mg/dL      Glucose 155 mg/dL      Calcium 9 6 mg/dL      AST 36 U/L      ALT 51 U/L      Alkaline Phosphatase 57 U/L      Total Protein 9 2 g/dL      Albumin 4 1 g/dL      Total Bilirubin 1 20 mg/dL      eGFR 61 ml/min/1 73sq m     Narrative:       [place] guidelines for Chronic Kidney Disease (CKD):     Stage 1 with normal or high GFR (GFR > 90 mL/min/1 73 square meters)    Stage 2 Mild CKD (GFR = 60-89 mL/min/1 73 square meters)    Stage 3A Moderate CKD (GFR = 45-59 mL/min/1 73 square meters)    Stage 3B Moderate CKD (GFR = 30-44 mL/min/1 73 square meters)    Stage 4 Severe CKD (GFR = 15-29 mL/min/1 73 square meters)    Stage 5 End Stage CKD (GFR <15 mL/min/1 73 square meters)  Note: GFR calculation is accurate only with a steady state creatinine    Troponin I [[id number]]  (Normal) Collected:  02/06/20 1843    Lab Status:  Final result Specimen:  Blood from Hand, Left Updated:  02/06/20 1918     Troponin I <0 02 ng/mL     CBC and differential [[id number]]  (Abnormal) Collected:  02/06/20 1843    Lab Status:  Final result Specimen:  Blood from Hand, Left Updated:  02/06/20 1854     WBC 13 60 Thousand/uL      RBC 6 02 Million/uL      Hemoglobin 17 9 g/dL      Hematocrit 54 1 %      MCV 90 fL MCH 29 7 pg      MCHC 33 1 g/dL      RDW 13 4 %      MPV 10 0 fL      Platelets 412 Thousands/uL      nRBC 0 /100 WBCs      Neutrophils Relative 66 %      Immat GRANS % 1 %      Lymphocytes Relative 22 %      Monocytes Relative 8 %      Eosinophils Relative 2 %      Basophils Relative 1 %      Neutrophils Absolute 9 06 Thousands/µL      Immature Grans Absolute 0 10 Thousand/uL      Lymphocytes Absolute 3 05 Thousands/µL      Monocytes Absolute 1 04 Thousand/µL      Eosinophils Absolute 0 25 Thousand/µL      Basophils Absolute 0 10 Thousands/µL                  CT head without contrast    (Results Pending)   MRI Inpatient Order    (Results Pending)          Ct head chronic small vessel disease, sinus disease read by vrad    Procedures  ECG 12 Lead Documentation Only  Date/Time: 2/7/2020 1:03 AM  Performed by: Aris Huerta DO  Authorized by: Aris Huerta DO     Indications / Diagnosis:  Dizziness  ECG reviewed by me, the ED Provider: yes    Patient location:  ED  Previous ECG:     Previous ECG:  Unavailable  Interpretation:     Interpretation: non-specific    Rate:     ECG rate:  82    ECG rate assessment: normal    Ectopy:     Ectopy: none    QRS:     QRS axis:  Normal    QRS intervals:  Normal  Conduction:     Conduction: abnormal      Abnormal conduction: LAFB    ST segments:     ST segments:  Normal  T waves:     T waves: normal               ED Course  ED Course as of Feb 07 0104   Thu Feb 06, 2020 2128 Delay in ct reading due to pacs downtime                Stroke Assessment     Row Name 02/06/20 1919             NIH Stroke Scale    Interval  Baseline      Level of Consciousness (1a )  0      LOC Questions (1b )  0      LOC Commands (1c )  0      Best Gaze (2 )  0      Visual (3 )  0      Facial Palsy (4 )  0      Motor Arm, Left (5a )  0      Motor Arm, Right (5b )  0      Motor Leg, Left (6a )  0      Motor Leg, Right (6b )  0      Limb Ataxia (7 )  1      Sensory (8 )  0      Best Language (9 )  0 Dysarthria (10 )  0      Extinction and Inattention (11 ) (Formerly Neglect)  0      Total  1          First Filed Value   TPA Decision  Patient not a TPA candidate  Patient is not a candidate options  Symptoms resolved/clearly non disabling , Unclear time of onset outside appropriate time window  MDM  Number of Diagnoses or Management Options  TIA (transient ischemic attack): new and requires workup     Amount and/or Complexity of Data Reviewed  Clinical lab tests: reviewed and ordered  Tests in the radiology section of CPT®: ordered and reviewed  Obtain history from someone other than the patient: yes  Discuss the patient with other providers: yes    Patient Progress  Patient progress: improved        Disposition  Final diagnoses:   TIA (transient ischemic attack)     Time reflects when diagnosis was documented in both MDM as applicable and the Disposition within this note     Time User Action Codes Description Comment    2/6/2020 11:00 PM Gaye Cristobal Add [G45 9] TIA (transient ischemic attack)       ED Disposition     None      Follow-up Information    None         There are no discharge medications for this patient  No discharge procedures on file      ED Provider  Electronically Signed by           Candi Patel DO  02/07/20 0104

## 2020-02-07 NOTE — PLAN OF CARE
Problem: PHYSICAL THERAPY ADULT  Goal: Performs mobility at highest level of function for planned discharge setting  See evaluation for individualized goals  Description  Treatment/Interventions: ADL retraining, Functional transfer training, LE strengthening/ROM, Elevations, Therapeutic exercise, Endurance training, OT, Spoke to nursing, Patient/family training, Equipment eval/education, Gait training  Equipment Recommended: Burak Bustamante       See flowsheet documentation for full assessment, interventions and recommendations  Outcome: Progressing  Note:   Prognosis: Fair  Problem List: Impaired balance, Decreased mobility, Decreased coordination, Impaired vision  Assessment: Pt presents with dizziness, impaired balance and coordination L ue  Also R eye gaze slightly inward and nytagmus present with visual testing  Noted to have bitten R inner corner of mouth  Pt not aware of same  Essentially non ambulatory at this time  Can benefit from skilled PT serivces to regain safe functional mobility  Recommend in-pt rehab at this time  Will follow see goals  Barriers to Discharge: Inaccessible home environment, Decreased caregiver support     Recommendation: (in-pt rehab)     PT - OK to Discharge: No    See flowsheet documentation for full assessment

## 2020-02-07 NOTE — ASSESSMENT & PLAN NOTE
Lab Results   Component Value Date    HGBA1C 8 7 (H) 02/07/2020       Recent Labs     02/07/20  1204 02/07/20  1629   POCGLU 205* 123       Blood Sugar Average: Last 72 hrs:  (P) 164   · QID glucose checks   · SSI   · Diabetic diet

## 2020-02-07 NOTE — PROGRESS NOTES
#20 LAC iv infiltrated during CTA  Ice applied upon return to unit  CRNPs notified during their shift report  Pharmacy called to see if anything needs to be administered to site, await return call

## 2020-02-07 NOTE — PHYSICAL THERAPY NOTE
PT eval     02/07/20 0941   Note Type   Note type Eval only   Pain Assessment   Pain Assessment No/denies pain   Pain Score No Pain   Home Living   Type of Home Apartment   Home Layout One level  (no roxi)   Prior Function   Level of [place] Independent with ADLs and functional mobility   Lives With Alone   ADL Assistance Independent   IADLs Independent   Falls in the last 6 months 0   Vocational Full time employment   Comments drives for [employer]   Restrictions/Precautions   Weight Bearing Precautions Per Order No   Other Precautions Fall Risk   General   Additional Pertinent History adm with dizziness x 2 days, r/o tia, HTN, Sleep apnea, DM   Family/Caregiver Present No   Cognition   Overall Cognitive Status WFL   Arousal/Participation Alert   Orientation Level Oriented X4   Memory Within functional limits   Following Commands Follows one step commands without difficulty   Comments c/o persistent dizziness poor tolerance for upright but still dizzy with laying down   RUE Assessment   RUE Assessment WFL   LUE Assessment   LUE Assessment WFL  (some ataxia see OT)   RLE Assessment   RLE Assessment WFL   LLE Assessment   LLE Assessment WFL   Proprioception   RLE Proprioception Grossly intact   LLE Proprioception Grossly Intact   Bed Mobility   Rolling R 4  Minimal assistance   Additional items Assist x 1;HOB elevated; Bedrails; Increased time required;LE management   Supine to Sit 3  Moderate assistance   Additional items Assist x 1   Sit to Supine 7  Independent   Transfers   Sit to Stand 4  Minimal assistance  (stood with handles of scale to assist)   Additional items Assist x 2   Stand to Sit 5  Supervision   Stand pivot Unable to assess   Ambulation/Elevation   Gait pattern   (NT asks to retrun to bed)   Balance   Static Sitting Fair   Dynamic Sitting Fair -   Static Standing Fair -   Endurance Deficit   Endurance Deficit Yes   Endurance Deficit Description jose sit EOB x5 min   Activity Tolerance Activity Tolerance Patient limited by fatigue   Medical Staff Made Aware OT Emeli   Nurse Made Aware MeganRN   Assessment   Prognosis Fair   Problem List Impaired balance;Decreased mobility; Decreased coordination; Impaired vision   Assessment Pt presents with dizziness, impaired balance and coordination L ue  Also R eye gaze slightly inward and nytagmus present with visual testing  Noted to have bitten R inner corner of mouth  Pt not aware of same  Essentially non ambulatory at this time  Can benefit from skilled PT serivces to regain safe functional mobility  Recommend in-pt rehab at this time  Will follow see goals  Barriers to Discharge Inaccessible home environment;Decreased caregiver support   Goals   Patient Goals get better resolve dizziness   STG Expiration Date 02/17/20   Short Term Goal #1 1) safe idn tnrasfesr 2) imrpove standing balance to fair+ 3) safe ind abm with ' level   Plan   Treatment/Interventions ADL retraining;Functional transfer training;LE strengthening/ROM; Elevations; Therapeutic exercise; Endurance training;OT;Spoke to nursing; Patient/family training;Equipment eval/education;Gait training   PT Frequency 5x/wk   Recommendation   Recommendation   (in-pt rehab)   Equipment Recommended Annelise Patel   PT - OK to Discharge No   Asa Mitten, PT

## 2020-02-07 NOTE — PROGRESS NOTES
Acceptance/Progress Note - Hanh Vogel 1960, 64 y o  male MRN: [id number]    Unit/Bed#: -01 Encounter: [id number]    Primary Care Provider: Gorge Spears DO   Date and time admitted to hospital: 2/6/2020  6:30 PM        * Stroke [facility])  Assessment & Plan  · As per MRI brain - Recent ischemia within the inferior left brainstem and brachium pontis  No hemorrhage or significant mass effect  Flow characteristics of the right vertebral artery abnormal   · Current deficits include, HA, dizziness while walking and occasional double vision   · CTA head/neck scheduled as per Neurology   · Neuro checks q1 hr  · Send for STAT imaging with any changes in mentation   · -180  · Neurology following   · Continue ASa & statin   · Stroke pathway   · As per Neurology concern for herniation, may need to start patient on 1 1/2 saline solution if Neuro status worsens  ECHO 2/7 - Systolic function was normal  Ejection fraction was estimated to be 55 %  There was mild concentric hypertrophy  Doppler parameters were consistent with abnormal left ventricular relaxation (grade 1 diastolic dysfunction)      Morbid obesity with body mass index of 40 0-49 9 ([address] )  Assessment & Plan  · Education on life style and dietary modfication    Diabetes mellitus ([facility] [address] )  Assessment & Plan  Lab Results   Component Value Date    HGBA1C 8 7 (H) 02/07/2020       Recent Labs     02/07/20  1204 02/07/20  1629   POCGLU 205* 123       Blood Sugar Average: Last 72 hrs:  (P) 164   · QID glucose checks   · SSI   · Diabetic diet     Hypertension  Assessment & Plan  · Allow for permissible HTN   · -180  · Restarted on home medications   · PRN IV hydralazine & labetolol for SBP > 180    Dizziness  Assessment & Plan  · Check orthostatics  · Monitor I/O   · Frequent neuro checks       Reason for Transfer / Chief Complaint: Posterior stroke     History of Present Illness:  Hanh Vogel is a 64 y o  male who presents with PMH of HTN, DM  Presents to the ED with stroke like symptoms, initial head CT was negative for any acute findings, the patient was originally admitted to [facility] [address]  MRI brain + for recent ischemia within the inferior left brainstem and brachium pontis  No hemorrhage or significant mass effect  Flow characteristics of the right vertebral artery abnormal   Neurology was consulted, patient was started on asa & statin, not a candidate for Tpa due to onset of symptom presentation which was Wednesday  Neurology would like patient to have closer neurological monitoring q1 hr as well as maintenance of BP parameters, -180  The patient was transferred to the ICU  History obtained from chart review and the patient  Past Medical History:  Past Medical History:   Diagnosis Date    Diabetes mellitus ([address] )     Diverticulitis of intestine     Hypertension        Past Surgical History:  Past Surgical History:   Procedure Laterality Date    CHOLECYSTECTOMY      HERNIA REPAIR      Umbilical hernia repair with mesh       Past Family History:  History reviewed  No pertinent family history  Social History:  Social History     Tobacco Use   Smoking Status Never Smoker   Smokeless Tobacco Never Used     Social History     Substance and Sexual Activity   Alcohol Use No    Alcohol/week: 0 0 standard drinks     Social History     Substance and Sexual Activity   Drug Use No     Marital Status: /Civil Union      Home Medications:   Prior to Admission medications    Medication Sig Start Date End Date Taking?  Authorizing Provider   amLODIPine (NORVASC) 10 mg tablet Take 10 mg by mouth daily   Yes Historical Provider, MD   buPROPion (WELLBUTRIN XL) 300 mg 24 hr tablet Take 300 mg by mouth daily   Yes Historical Provider, MD   hydrochlorothiazide (HYDRODIURIL) 25 mg tablet Take 25 mg by mouth daily   Yes Historical Provider, MD   losartan (COZAAR) 100 MG tablet Take 25 mg by mouth daily   Yes Historical Provider, MD   metFORMIN (GLUCOPHAGE) 500 mg tablet Take 500 mg by mouth daily with breakfast   Yes Historical Provider, MD   sertraline (ZOLOFT) 50 mg tablet Take 50 mg by mouth daily   Yes Historical Provider, MD       Inpatient Medications:  Scheduled Meds:    Current Facility-Administered Medications:  acetaminophen 650 mg Oral Q6H PRN MD Marquita Mars ON 2/8/2020] amLODIPine 10 mg Oral Daily Inga Isbell MD   aspirin 81 mg Oral Daily Stanley Cristobal PA-C   atorvastatin 40 mg Oral Daily With Shelby Torres MD   buPROPion 300 mg Oral Daily Inga Isbell MD   chlorhexidine 15 mL Swish & Spit Q12H [facility] CHRISTINA Florentino   enoxaparin 40 mg Subcutaneous Daily Stanley Cristobal PA-C   hydrALAZINE 10 mg Intravenous Q6H PRN Inga Isbell MD   [START ON 2/8/2020] hydrochlorothiazide 25 mg Oral Daily Inga Isbell MD   insulin lispro 2-12 Units Subcutaneous TID AC MD Marquita Mars ON 2/8/2020] losartan 25 mg Oral Daily Inga Isbell MD   meclizine 25 mg Oral Q8H PRN Inga Ibsell MD   ondansetron 4 mg Intravenous Q6H PRN Inga Isbell MD   sertraline 50 mg Oral Daily Inga Isbell MD     Continuous Infusions:   PRN Meds:    acetaminophen 650 mg Q6H PRN   hydrALAZINE 10 mg Q6H PRN   meclizine 25 mg Q8H PRN   ondansetron 4 mg Q6H PRN       Allergies:  No Known Allergies    ROS:   Review of Systems   Constitutional: Negative  HENT: Negative  Eyes: Positive for visual disturbance  Respiratory: Negative  Cardiovascular: Negative  Gastrointestinal: Negative  Endocrine: Negative  Genitourinary: Negative  Musculoskeletal: Positive for gait problem  Allergic/Immunologic: Negative  Neurological: Positive for dizziness, light-headedness and headaches  Hematological: Negative  Psychiatric/Behavioral: Negative          Vitals:  Vitals:    02/07/20 0938 02/07/20 1204 02/07/20 1518 02/07/20 1711   BP:  127/91 (!) 157/104 164/76   BP Location: Left arm   Pulse:  91 87 86   Resp:   20 22   Temp:  98 3 °F (36 8 °C) 98 2 °F (36 8 °C) 97 9 °F (36 6 °C)   TempSrc:    Oral   SpO2:  94% 90% 93%   Weight: 136 kg (299 lb)        Temperature:   Temp (24hrs), Av 5 °F (36 9 °C), Min:97 9 °F (36 6 °C), Max:99 8 °F (37 7 °C)    Current Temperature: 97 9 °F (36 6 °C)    Weights:   IBW: -88 kg  Body mass index is 45 46 kg/m²  Hemodynamic Monitoring:  N/A     Non-Invasive/Invasive Ventilation Settings:  Respiratory    Lab Data (Last 4 hours)    None         O2/Vent Data (Last 4 hours)    None              No results found for: PHART, XGL0ABU, PO2ART, TBU2JTE, K0LGNWFZ, BEART, SOURCE  SpO2: SpO2: 93 %     Physical Exam:  Physical Exam   Constitutional: He is oriented to person, place, and time  No distress  HENT:   Head: Normocephalic and atraumatic  Mouth/Throat: Oropharynx is clear and moist    Eyes: Pupils are equal, round, and reactive to light  Neck: Normal range of motion  No tracheal deviation present  Cardiovascular: Normal rate and regular rhythm  Pulmonary/Chest: Effort normal and breath sounds normal    Abdominal: Soft  Bowel sounds are normal  He exhibits no distension  Musculoskeletal: Normal range of motion  Lymphadenopathy:     He has no cervical adenopathy  Neurological: He is alert and oriented to person, place, and time  Gait abnormal    Skin: Skin is warm and dry  He is not diaphoretic  Psychiatric: He has a normal mood and affect         Labs:  Results from last 7 days   Lab Units 20  0444 20  1843   WBC Thousand/uL 10 99* 13 60*   HEMOGLOBIN g/dL 16 3 17 9*   HEMATOCRIT % 49 4* 54 1*   PLATELETS Thousands/uL 230 297   NEUTROS PCT % 69 66   MONOS PCT % 7 8     Results from last 7 days   Lab Units 20  0444 20  1843   SODIUM mmol/L 138 138   POTASSIUM mmol/L 4 0 3 7   CHLORIDE mmol/L 102 100   CO2 mmol/L 27 25   ANION GAP mmol/L 9 13   BUN mg/dL 24 25   CREATININE mg/dL 1 10 1 29   CALCIUM mg/dL 8 6 9 6 ALT U/L  --  51   AST U/L  --  36   ALK PHOS U/L  --  57   ALBUMIN g/dL  --  4 1   TOTAL BILIRUBIN mg/dL  --  1 20*     Results from last 7 days   Lab Units 02/07/20  0444   MAGNESIUM mg/dL 1 8   PHOSPHORUS mg/dL 2 6*          Results from last 7 days   Lab Units 02/06/20  1843   TROPONIN I ng/mL <0 02         ABG:No results found for: PHART, LAU0NEE, PO2ART, MIH1WEW, J6XGCDAW, BEART, SOURCE  VBG:          Imaging: MRI brain/head CT I have personally reviewed pertinent reports  EKG: SR This was personally reviewed by myself  Micro:        ______________________________________________________________________    Disposition : Transfer to ICU for closer Neurological monitoring     Counseling / Coordination of Care  Total Critical Care time spent 30 minutes excluding procedures, teaching and family updates  ______________________________________________________________________    VTE Pharmacologic Prophylaxis: Enoxaparin (Lovenox)  VTE Mechanical Prophylaxis: sequential compression device    Invasive lines and devices: Invasive Devices     Peripheral Intravenous Line            Peripheral IV 02/06/20 Left Hand less than 1 day                Code Status: Level 1 - Full Code  POA:    POLST:      Given critical illness, patient length of stay will require greater than two midnights  Portions of the record may have been created with voice recognition software  Occasional wrong word or "sound a like" substitutions may have occurred due to the inherent limitations of voice recognition software  Read the chart carefully and recognize, using context, where substitutions have occurred        CHRISTINA Louis    Consults

## 2020-02-07 NOTE — OCCUPATIONAL THERAPY NOTE
Occupational Therapy Evaluation     Patient Name: Rufus Pryor  FSPAJ'I Date: 2/7/2020  Problem List  Principal Problem:    TIA (transient ischemic attack)  Active Problems:    Dizziness    Hypertension    Past Medical History  Past Medical History:   Diagnosis Date    Diabetes mellitus ([address] )     Diverticulitis of intestine     Hypertension      Past Surgical History  Past Surgical History:   Procedure Laterality Date    CHOLECYSTECTOMY      HERNIA REPAIR      Umbilical hernia repair with mesh         02/07/20 0940   Note Type   Note type Eval only   Restrictions/Precautions   Weight Bearing Precautions Per Order Yes  (Simultaneous filing   User may not have seen previous data )   Other Precautions Fall Risk   Pain Assessment   Pain Assessment No/denies pain   Pain Score No Pain   Home Living   Type of Home Apartment   Home Layout One level   Bathroom Shower/Tub Tub/shower unit   Prior Function   Level of [place] Independent with ADLs and functional mobility   Lives With Alone   ADL Assistance Independent   IADLs Independent   Falls in the last 6 months 0   Vocational Full time employment  ( for [employer])   Comments Mother lives across the street   Psychosocial   Psychosocial (WDL) WDL   ADL   Eating Assistance 7  Independent   Eating Deficit Setup   Grooming Assistance 7  Independent   Grooming Deficit Setup   [address] 4  Minimal Assistance   LB [address] 3  Moderate Assistance   [address] 4  [address] 3  Moderate [address]  4  Minimal Assistance   Bed Mobility   Supine to Sit 3  Moderate assistance   Additional items Assist x 1   Sit to Supine   (CGA)   Transfers   Sit to Stand 4  Minimal assistance   Additional items Assist x 2   Stand to Sit 4  Minimal assistance   Additional items Assist x 1   Balance   Static Sitting Fair +   Dynamic Sitting Fair   Static Standing Fair -   Dynamic Standing Poor +   Activity Tolerance   Activity Tolerance   (Limited by dizziness)   Medical Staff Made Aware BEBA FAJARDO Assessment   RUE Assessment WFL   LUE Assessment   LUE Assessment WFL   Hand Function   Gross Motor Coordination Impaired  (+ LUE ataxia)   Fine Motor Coordination Impaired  (diffuculty performing LUE )   Sensation   Light Touch No apparent deficits   Vision-Basic Assessment   Patient Visual Report Blurring of print when reading   Vision - Complex Assessment   Visual Screen Results   (+ nystagmus with left sided gaze)   Additional Comments Pt reporting dizziness (worsened with sitting/standing)    Cognition   Overall Cognitive Status WFL   Arousal/Participation Alert   Attention Within functional limits   Memory Within functional limits   Following Commands Follows one step commands without difficulty   Assessment   Limitation Decreased ADL status; Decreased self-care trans;Decreased high-level ADLs  (impaired coordination)   Prognosis Good   Assessment Pt is a 64 y o  male seen for OT evaluation at [facility], admitted 2/6/2020 w/ TIA (transient ischemic attack)  OT completed expanded review of pt's medical and social history  Comorbidities affecting pt's functional performance at time of assessment include: HTN, dizziness, and DM  Personal factors affecting pt at time of IE include:difficulty performing ADLS, difficulty performing IADLS  and decreased functional mobility   Prior to admission, pt was living alone in an apt and was independent with ADLs and functional mobility  Upon evaluation, pt presents to OT below baseline due to the following performance deficits: decreased balance, impaired GMC and impaired [address]  Pt also limited by dizziness at this time  Pt to benefit from continued skilled OT tx while in the hospital to address deficits as defined above and maximize level of functional independence w ADL's and functional mobility   Occupational Performance areas to address include: bathing/shower, toilet hygiene, dressing, functional mobility, community mobility, clothing management and job performance/volunteering  Based on findings, pt is of moderate complexity  At this time, OT recommendations at time of discharge are short term  OT to follow progress closely  Goals   Patient Goals Pt wishes to improve functional mobility    Plan   Treatment Interventions ADL retraining;Functional transfer training;Neuromuscular reeducation; Compensatory technique education;Continued evaluation;Patient/family training;Equipment evaluation/education;UE strengthening/ROM   Goal Expiration Date 02/17/20   OT Treatment Day 0   OT Frequency 3-5x/wk   Recommendation   OT Discharge Recommendation Short Term Rehab         Pt will achieve the following goals within 10 days  *Pt will complete UB bathing and dressing with mod I     *Pt will complete LB bathing and dressing with mod I      *Pt will complete toileting (hygiene and clothing management) with mod I  using DME as needed  *Pt will complete bed mobility with mod I, with bed flat and no side rail to prep for purposeful tasks    *Pt will perform functional transfers with on/off all surfaces with mod I using DME as needed w/ G balance/safety  *Pt will increase standing tolerance to 5 minutes in order to complete sinkside ADL task  *Pt will demonstrate increased activity tolerance in order to complete ADL routine        [place] OTR/L

## 2020-02-08 PROBLEM — D72.829 LEUKOCYTOSIS: Status: ACTIVE | Noted: 2020-02-08

## 2020-02-08 PROBLEM — T80.1XXA IV INFILTRATE: Status: ACTIVE | Noted: 2020-02-08

## 2020-02-08 PROBLEM — E11.9 DIABETES MELLITUS (HCC): Chronic | Status: ACTIVE | Noted: 2020-02-07

## 2020-02-08 PROBLEM — E66.01 MORBID OBESITY WITH BODY MASS INDEX OF 40.0-49.9 (HCC): Chronic | Status: ACTIVE | Noted: 2020-02-07

## 2020-02-08 PROBLEM — F32.A DEPRESSION: Chronic | Status: ACTIVE | Noted: 2020-02-08

## 2020-02-08 PROBLEM — F32.A DEPRESSION: Status: ACTIVE | Noted: 2020-02-08

## 2020-02-08 PROBLEM — I10 HYPERTENSION: Chronic | Status: ACTIVE | Noted: 2020-02-07

## 2020-02-08 LAB
ALBUMIN SERPL BCP-MCNC: 3.8 G/DL (ref 3.5–5)
ALP SERPL-CCNC: 57 U/L (ref 46–116)
ALT SERPL W P-5'-P-CCNC: 49 U/L (ref 12–78)
ANION GAP SERPL CALCULATED.3IONS-SCNC: 10 MMOL/L (ref 4–13)
AST SERPL W P-5'-P-CCNC: 30 U/L (ref 5–45)
BASOPHILS # BLD AUTO: 0.09 THOUSANDS/ΜL (ref 0–0.1)
BASOPHILS NFR BLD AUTO: 1 % (ref 0–1)
BILIRUB SERPL-MCNC: 1.4 MG/DL (ref 0.2–1)
BUN SERPL-MCNC: 21 MG/DL (ref 5–25)
CALCIUM SERPL-MCNC: 9 MG/DL (ref 8.3–10.1)
CHLORIDE SERPL-SCNC: 102 MMOL/L (ref 100–108)
CO2 SERPL-SCNC: 26 MMOL/L (ref 21–32)
CREAT SERPL-MCNC: 1.09 MG/DL (ref 0.6–1.3)
EOSINOPHIL # BLD AUTO: 0.15 THOUSAND/ΜL (ref 0–0.61)
EOSINOPHIL NFR BLD AUTO: 1 % (ref 0–6)
ERYTHROCYTE [DISTWIDTH] IN BLOOD BY AUTOMATED COUNT: 13.5 % (ref 11.6–15.1)
GFR SERPL CREATININE-BSD FRML MDRD: 75 ML/MIN/1.73SQ M
GLUCOSE SERPL-MCNC: 135 MG/DL (ref 65–140)
GLUCOSE SERPL-MCNC: 144 MG/DL (ref 65–140)
GLUCOSE SERPL-MCNC: 163 MG/DL (ref 65–140)
GLUCOSE SERPL-MCNC: 167 MG/DL (ref 65–140)
GLUCOSE SERPL-MCNC: 218 MG/DL (ref 65–140)
HCT VFR BLD AUTO: 51.8 % (ref 36.5–49.3)
HGB BLD-MCNC: 17 G/DL (ref 12–17)
IMM GRANULOCYTES # BLD AUTO: 0.06 THOUSAND/UL (ref 0–0.2)
IMM GRANULOCYTES NFR BLD AUTO: 1 % (ref 0–2)
LYMPHOCYTES # BLD AUTO: 2.33 THOUSANDS/ΜL (ref 0.6–4.47)
LYMPHOCYTES NFR BLD AUTO: 22 % (ref 14–44)
MAGNESIUM SERPL-MCNC: 2 MG/DL (ref 1.6–2.6)
MCH RBC QN AUTO: 29.8 PG (ref 26.8–34.3)
MCHC RBC AUTO-ENTMCNC: 32.8 G/DL (ref 31.4–37.4)
MCV RBC AUTO: 91 FL (ref 82–98)
MONOCYTES # BLD AUTO: 0.83 THOUSAND/ΜL (ref 0.17–1.22)
MONOCYTES NFR BLD AUTO: 8 % (ref 4–12)
NEUTROPHILS # BLD AUTO: 7.14 THOUSANDS/ΜL (ref 1.85–7.62)
NEUTS SEG NFR BLD AUTO: 67 % (ref 43–75)
NRBC BLD AUTO-RTO: 0 /100 WBCS
PHOSPHATE SERPL-MCNC: 2.4 MG/DL (ref 2.7–4.5)
PLATELET # BLD AUTO: 236 THOUSANDS/UL (ref 149–390)
PMV BLD AUTO: 9.5 FL (ref 8.9–12.7)
POTASSIUM SERPL-SCNC: 4 MMOL/L (ref 3.5–5.3)
PROCALCITONIN SERPL-MCNC: <0.05 NG/ML
PROT SERPL-MCNC: 8.3 G/DL (ref 6.4–8.2)
RBC # BLD AUTO: 5.7 MILLION/UL (ref 3.88–5.62)
SODIUM SERPL-SCNC: 138 MMOL/L (ref 136–145)
WBC # BLD AUTO: 10.6 THOUSAND/UL (ref 4.31–10.16)

## 2020-02-08 PROCEDURE — 84145 PROCALCITONIN (PCT): CPT | Performed by: NURSE PRACTITIONER

## 2020-02-08 PROCEDURE — 83735 ASSAY OF MAGNESIUM: CPT | Performed by: INTERNAL MEDICINE

## 2020-02-08 PROCEDURE — 84100 ASSAY OF PHOSPHORUS: CPT | Performed by: INTERNAL MEDICINE

## 2020-02-08 PROCEDURE — 82948 REAGENT STRIP/BLOOD GLUCOSE: CPT

## 2020-02-08 PROCEDURE — 80053 COMPREHEN METABOLIC PANEL: CPT | Performed by: INTERNAL MEDICINE

## 2020-02-08 PROCEDURE — 85025 COMPLETE CBC W/AUTO DIFF WBC: CPT | Performed by: INTERNAL MEDICINE

## 2020-02-08 PROCEDURE — 99233 SBSQ HOSP IP/OBS HIGH 50: CPT | Performed by: INTERNAL MEDICINE

## 2020-02-08 PROCEDURE — NC001 PR NO CHARGE: Performed by: NURSE PRACTITIONER

## 2020-02-08 RX ORDER — CLOPIDOGREL BISULFATE 75 MG/1
75 TABLET ORAL DAILY
Status: DISCONTINUED | OUTPATIENT
Start: 2020-02-08 | End: 2020-02-10 | Stop reason: HOSPADM

## 2020-02-08 RX ORDER — ECHINACEA PURPUREA EXTRACT 125 MG
1 TABLET ORAL 3 TIMES DAILY PRN
Status: DISCONTINUED | OUTPATIENT
Start: 2020-02-08 | End: 2020-02-10 | Stop reason: HOSPADM

## 2020-02-08 RX ADMIN — CLOPIDOGREL BISULFATE 75 MG: 75 TABLET ORAL at 12:21

## 2020-02-08 RX ADMIN — SALINE NASAL SPRAY 1 SPRAY: 1.5 SOLUTION NASAL at 09:37

## 2020-02-08 RX ADMIN — HYDROCHLOROTHIAZIDE 25 MG: 25 TABLET ORAL at 08:23

## 2020-02-08 RX ADMIN — POTASSIUM & SODIUM PHOSPHATES POWDER PACK 280-160-250 MG 2 PACKET: 280-160-250 PACK at 15:51

## 2020-02-08 RX ADMIN — INSULIN LISPRO 2 UNITS: 100 INJECTION, SOLUTION INTRAVENOUS; SUBCUTANEOUS at 08:21

## 2020-02-08 RX ADMIN — SERTRALINE HYDROCHLORIDE 50 MG: 50 TABLET ORAL at 08:24

## 2020-02-08 RX ADMIN — BUPROPION HYDROCHLORIDE 300 MG: 300 TABLET, FILM COATED, EXTENDED RELEASE ORAL at 08:24

## 2020-02-08 RX ADMIN — INSULIN LISPRO 4 UNITS: 100 INJECTION, SOLUTION INTRAVENOUS; SUBCUTANEOUS at 11:16

## 2020-02-08 RX ADMIN — LOSARTAN POTASSIUM 25 MG: 25 TABLET, FILM COATED ORAL at 08:24

## 2020-02-08 RX ADMIN — ATORVASTATIN CALCIUM 40 MG: 40 TABLET, FILM COATED ORAL at 15:50

## 2020-02-08 RX ADMIN — ENOXAPARIN SODIUM 40 MG: 40 INJECTION SUBCUTANEOUS at 08:22

## 2020-02-08 RX ADMIN — ACETAMINOPHEN 650 MG: 325 TABLET ORAL at 15:50

## 2020-02-08 RX ADMIN — ASPIRIN 81 MG 81 MG: 81 TABLET ORAL at 08:23

## 2020-02-08 RX ADMIN — AMLODIPINE BESYLATE 10 MG: 5 TABLET ORAL at 08:24

## 2020-02-08 NOTE — PROGRESS NOTES
Tranfer/Progress Note - Jose Patricia 1960, 64 y o  male MRN: [id number]    Unit/Bed#: -01 Encounter: [id number]    Primary Care Provider: Mike Reyna DO   Date and time admitted to hospital: 2/6/2020  6:30 PM      * Stroke [facility])  Assessment & Plan  · MRI with recent ischemia within the inferior left brainstem and brachium pontis  No hemorrhage  Abnormal flow characteristics in the right vertebral artery  · CTA of the head and done - as per Neurology, stroke is small, able to decrease neuro checks    · Patient was out of the time window for tPA on admission  · Neurology following  · Stroke pathway  · Continue aspirin/statin, start on plavix as per Neurology note   · Echo with an EF of 34% grade 1 diastolic dysfunction  · Fasting lipid profile 146/123/43/78  · A1c 8 7  · PT/OT  · Continue Cozaar/hydrochlorothiazide/Norvasc  · No AFib noted on telemetry  · Will need outpatient sleep study likely obstructive sleep apnea    Hypertension  Assessment & Plan  · Remains hypertensive despite home medications of Cozaar/hydrochlorothiazide/Norvasc  · P r n  hydralazine given once last p m    · Will continue current home meds   · -160, 1 x event of 170s  · Will discuss with Neurology for BP parameters and adjust medications            IV infiltrate  Assessment & Plan  · Patient with an infiltrated IV contrast in the left antecubital  · IV was discontinued in CT scan   · Did not administer hyaluronidase given iv d/c'd  · Continue to ice and elevate with neurovascular checks  · Radial pulse intact     Diabetes mellitus [facility])  Assessment & Plan  Lab Results   Component Value Date    HGBA1C 8 7 (H) 02/07/2020       Recent Labs     02/07/20  1629 02/07/20  2113 02/08/20  0727 02/08/20  1112   POCGLU 123 135 167* 218*       Blood Sugar Average: Last 72 hrs:  (P) 169 6   · Continue sliding scale insulin  · Holding home metformin    Leukocytosis  Assessment & Plan  · Likely reactive  · Procal negative, U/a negative   · Afebrile    Depression  Assessment & Plan  · Continue zoloft & Wellbutrin     Morbid obesity with body mass index of 40 0-49 9 (HCC)  Assessment & Plan  · Lifestyle and dietary modifications     Dizziness  Assessment & Plan  · Likely related to stroke   · PT/OT following, appreciate recommendations         Code Status: Level 1 - Full Code  POA:    POLST:      Reason for ICU admission:   Close neurological monitoring     Active problems:   Principal Problem:    Stroke [facility])  Active Problems:    Hypertension    Diabetes mellitus ([facility] [address] )    IV infiltrate    Leukocytosis    Morbid obesity with body mass index of 40 0-49 9 (HCC)    Depression    Dizziness  Resolved Problems:    * No resolved hospital problems  *      Consultants:   Neurology  PT  OT  CC    History of Present Illness:   As per AKIKO Torrez "Haile Ovalle is a 64 y o  male with a history of hypertension, she also has a surgical history of recurrent umbilical hernia, he came in by ambulance with concern of dizziness and room spinning sensation and also problem with his left arm is arm felt really weak and different around noon today which then finally resolved, he had a recent ear infection on the left and some sinus congestion  F[place] E dizziness is like a spinning sensation and he has the associated nausea  "    Summary of clinical course: As per MRI of the brain patient was found to have, recent ischemia within the inferior left brainstem and brachium pontis  No hemorrhage or significant mass effect  Flow characteristics of the right vertebral artery abnormal   The patient was transferred to the ICU due to findings and ongoing symptoms such as dizziness, occasional blurry vision, and headache  He was at high risk for decompensation and required closer neurological monitoring    He had a CTA of the head and neck overnight which Neurology reviewed personally and noted that the stroke is a very small one, therefore the patient did not require extensive neurological monitoring  While he was in the ICU he had no changes in his mental status other than dizziness while he attempted to get up  He was started on plavix, asa & statin  During his CTA of the head/neck his IV infiltrated and he had extravasation of contrast into his LUE  The IV was pulled therefore he was treated with ice and elevation  He has intact pulses and is on neurovascular checks q shift  He was also noted to have DELANO but was not compliant with wearing a cpap at night due to "not having one"  He is to f/u with Dr Rogerio Tran for a sleep study once he is d/c  Recent or scheduled procedures:   CTA head/neck     Outstanding/pending diagnostics:   CTA head/neck     Cultures:   N/a        Mobilization Plan:   OOB as tolerated with assistance     Nutrition Plan:   Diabetic diet     VTE Pharmacologic Prophylaxis: Enoxaparin (Lovenox)  VTE Mechanical Prophylaxis: sequential compression device    Discharge Plan:   Patient should be ready for discharge to inpatient rehab within the next 24-48 hrs  Initial Physical Therapy Recommendations: Inpatient rehab  Initial Occupational Therapy Recommendations: Short term rehab  Initial /Plan: referrals sent to [facility] and [facility] as per patient request  Referrals sent via [facility]  Wait bed availability  Home medications that are not reordered and reason why:   Metformin due to contrast administration, resume closer to d/c        Spoke with Dr Yamile Lanier regarding transfer  Please call [phone] with any questions or concerns  Portions of the record may have been created with voice recognition software  Occasional wrong word or "sound a like" substitutions may have occurred due to the inherent limitations of voice recognition software  Read the chart carefully and recognize, using context, where substitutions have occurred

## 2020-02-08 NOTE — ASSESSMENT & PLAN NOTE
· Patient with an infiltrated IV contrast in the left antecubital  · IV was discontinued in CT scan   · Did not administer hyaluronidase given iv d/c'd  · Continue to ice and elevate with neurovascular checks

## 2020-02-08 NOTE — ASSESSMENT & PLAN NOTE
Lab Results   Component Value Date    HGBA1C 8 7 (H) 02/07/2020       Recent Labs     02/07/20  1204 02/07/20  1629 02/07/20  2113   POCGLU 205* 123 135       Blood Sugar Average: Last 72 hrs:  (P) 584 [id number]   · Continue sliding scale insulin  · Holding home metformin

## 2020-02-08 NOTE — ASSESSMENT & PLAN NOTE
· MRI with recent ischemia within the inferior left brainstem and brachium pontis  No hemorrhage    Abnormal flow characteristics in the right vertebral artery  · CTA of the head and done pending read  · Patient was out of the time window for tPA on admission  · Neurology following  · Stroke pathway  · Continue aspirin/statin  · Echo with an EF of 70% grade 1 diastolic dysfunction  · Fasting lipid profile 146/123/43/78  · A1c 8 7  · PT/OT  · Permissive hypertension will discuss with neurology goals patient currently on Cozaar/hydrochlorothiazide/Norvasc  · No AFib noted on telemetry  · Will need outpatient sleep study likely obstructive sleep apnea

## 2020-02-08 NOTE — PLAN OF CARE
Problem: Neurological Deficit  Goal: Neurological status is stable or improving  Description  Interventions:  - Monitor and assess patient's level of consciousness, motor function, sensory function, and level of assistance needed for ADLs  - Monitor and report changes from baseline  Collaborate with interdisciplinary team to initiate plan and implement interventions as ordered  - Provide and maintain a safe environment  - Consider seizure precautions  - Consider fall precautions  - Consider aspiration precautions  - Consider bleeding precautions  Outcome: Progressing     Problem: Activity Intolerance/Impaired Mobility  Goal: Mobility/activity is maintained at optimum level for patient  Description  Interventions:  - Assess and monitor patient  barriers to mobility and need for assistive/adaptive devices  - Assess patient's emotional response to limitations  - Collaborate with interdisciplinary team and initiate plans and interventions as ordered  - Encourage independent activity per ability   - Maintain proper body alignment  - Perform active/passive rom as tolerated/ordered  - Plan activities to conserve energy   - Turn patient as appropriate  Outcome: Progressing     Problem: Communication Impairment  Goal: Ability to express needs and understand communication  Description  Assess patient's communication skills and ability to understand information  Patient will demonstrate use of effective communication techniques, alternative methods of communication and understanding even if not able to speak  - Encourage communication and provide alternate methods of communication as needed  - Collaborate with case management/ for discharge needs  - Include patient/family/caregiver in decisions related to communication    Outcome: Progressing     Problem: Potential for Aspiration  Goal: Non-ventilated patient's risk of aspiration is minimized  Description  Assess and monitor vital signs, respiratory status, and labs (WBC)  Monitor for signs of aspiration (tachypnea, cough, rales, wheezing, cyanosis, fever)  - Assess and monitor patient's ability to swallow  - Place patient up in chair to eat if possible  - HOB up at 90 degrees to eat if unable to get patient up into chair   - Supervise patient during oral intake  - Instruct patient/ family to take small bites  - Instruct patient/ family to take small single sips when taking liquids  - Follow patient-specific strategies generated by speech pathologist   Outcome: Progressing  Goal: Ventilated patient's risk of aspiration is minimized  Description  Assess and monitor vital signs, respiratory status, airway cuff pressure, and labs (WBC)  Monitor for signs of aspiration (tachypnea, cough, rales, wheezing, cyanosis, fever)  - Elevate head of bed 30 degrees if patient has tube feeding   - Monitor tube feeding  Outcome: Progressing     Problem: Nutrition  Goal: Nutrition/Hydration status is improving  Description  Monitor and assess patient's nutrition/hydration status for malnutrition (ex- brittle hair, bruises, dry skin, pale skin and conjunctiva, muscle wasting, smooth red tongue, and disorientation)  Collaborate with interdisciplinary team and initiate plan and interventions as ordered  Monitor patient's weight and dietary intake as ordered or per policy  Utilize nutrition screening tool and intervene per policy  Determine patient's food preferences and provide high-protein, high-caloric foods as appropriate  - Assist patient with eating   - Allow adequate time for meals   - Encourage patient to take dietary supplement as ordered  - Collaborate with clinical nutritionist   - Include patient/family/caregiver in decisions related to nutrition    Outcome: Progressing     Problem: PAIN - ADULT  Goal: Verbalizes/displays adequate comfort level or baseline comfort level  Description  Interventions:  - Encourage patient to monitor pain and request assistance  - Assess pain using appropriate pain scale  - Administer analgesics based on type and severity of pain and evaluate response  - Implement non-pharmacological measures as appropriate and evaluate response  - Consider cultural and social influences on pain and pain management  - Notify physician/advanced practitioner if interventions unsuccessful or patient reports new pain  Outcome: Progressing     Problem: INFECTION - ADULT  Goal: Absence or prevention of progression during hospitalization  Description  INTERVENTIONS:  - Assess and monitor for signs and symptoms of infection  - Monitor lab/diagnostic results  - Monitor all insertion sites, i e  indwelling lines, tubes, and drains  - Monitor endotracheal if appropriate and nasal secretions for changes in amount and color  - [place] appropriate cooling/warming therapies per order  - Administer medications as ordered  - Instruct and encourage patient and family to use good hand hygiene technique  - Identify and instruct in appropriate isolation precautions for identified infection/condition  Outcome: Progressing  Goal: Absence of fever/infection during neutropenic period  Description  INTERVENTIONS:  - Monitor WBC    Outcome: Progressing     Problem: SAFETY ADULT  Goal: Patient will remain free of falls  Description  INTERVENTIONS:  - Assess patient frequently for physical needs  -  Identify cognitive and physical deficits and behaviors that affect risk of falls    -  [place] fall precautions as indicated by assessment   - Educate patient/family on patient safety including physical limitations  - Instruct patient to call for assistance with activity based on assessment  - Modify environment to reduce risk of injury  - Consider OT/PT consult to assist with strengthening/mobility  Outcome: Progressing  Goal: Maintain or return to baseline ADL function  Description  INTERVENTIONS:  -  Assess patient's ability to carry out ADLs; assess patient's baseline for ADL function and identify physical deficits which impact ability to perform ADLs (bathing, care of mouth/teeth, toileting, grooming, dressing, etc )  - Assess/evaluate cause of self-care deficits   - Assess range of motion  - Assess patient's mobility; develop plan if impaired  - Assess patient's need for assistive devices and provide as appropriate  - Encourage maximum independence but intervene and supervise when necessary  - Involve family in performance of ADLs  - Assess for home care needs following discharge   - Consider OT consult to assist with ADL evaluation and planning for discharge  - Provide patient education as appropriate  Outcome: Progressing  Goal: Maintain or return mobility status to optimal level  Description  INTERVENTIONS:  - Assess patient's baseline mobility status (ambulation, transfers, stairs, etc )    - Identify cognitive and physical deficits and behaviors that affect mobility  - Identify mobility aids required to assist with transfers and/or ambulation (gait belt, sit-to-stand, lift, walker, cane, etc )  - [place] fall precautions as indicated by assessment  - Record patient progress and toleration of activity level on Mobility SBAR; progress patient to next Phase/Stage  - Instruct patient to call for assistance with activity based on assessment  - Consider rehabilitation consult to assist with strengthening/weightbearing, etc   Outcome: Progressing     Problem: DISCHARGE PLANNING  Goal: Discharge to home or other facility with appropriate resources  Description  INTERVENTIONS:  - Identify barriers to discharge w/patient and caregiver  - Arrange for needed discharge resources and transportation as appropriate  - Identify discharge learning needs (meds, wound care, etc )  - Arrange for interpretive services to assist at discharge as needed  - Refer to Case Management Department for coordinating discharge planning if the patient needs post-hospital services based on physician/advanced practitioner order or complex needs related to functional status, cognitive ability, or social support system  Outcome: Progressing     Problem: Knowledge Deficit  Goal: Patient/family/caregiver demonstrates understanding of disease process, treatment plan, medications, and discharge instructions  Description  Complete learning assessment and assess knowledge base  Interventions:  - Provide teaching at level of understanding  - Provide teaching via preferred learning methods  Outcome: Progressing     Problem: CARDIOVASCULAR - ADULT  Goal: Maintains optimal cardiac output and hemodynamic stability  Description  INTERVENTIONS:  - Monitor I/O, vital signs and rhythm  - Monitor for S/S and trends of decreased cardiac output  - Administer and titrate ordered vasoactive medications to optimize hemodynamic stability  - Assess quality of pulses, skin color and temperature  - Assess for signs of decreased coronary artery perfusion  - Instruct patient to report change in severity of symptoms  Outcome: Progressing  Goal: Absence of cardiac dysrhythmias or at baseline rhythm  Description  INTERVENTIONS:  - Continuous cardiac monitoring, vital signs, obtain 12 lead EKG if ordered  - Administer antiarrhythmic and heart rate control medications as ordered  - Monitor electrolytes and administer replacement therapy as ordered  Outcome: Progressing     Problem: Potential for Falls  Goal: Patient will remain free of falls  Description  INTERVENTIONS:  - Assess patient frequently for physical needs  -  Identify cognitive and physical deficits and behaviors that affect risk of falls    -  [place] fall precautions as indicated by assessment   - Educate patient/family on patient safety including physical limitations  - Instruct patient to call for assistance with activity based on assessment  - Modify environment to reduce risk of injury  - Consider OT/PT consult to assist with strengthening/mobility  Outcome: Progressing

## 2020-02-08 NOTE — PROGRESS NOTES
Progress Note - Sina Charles 1960, 64 y o  male MRN: [id number]    Unit/Bed#: -01 Encounter: [id number]    Primary Care Provider: Duc Gilliam DO   Date and time admitted to hospital: 2/6/2020  6:30 PM        * Stroke [facility])  Assessment & Plan  · MRI with recent ischemia within the inferior left brainstem and brachium pontis  No hemorrhage    Abnormal flow characteristics in the right vertebral artery  · CTA of the head and done pending read  · Patient was out of the time window for tPA on admission  · Neurology following  · Stroke pathway  · Echo with an EF of 93% grade 1 diastolic dysfunction  · Fasting lipid profile 146/123/43/78  · A1c 8 7  · PT/OT  · Permissive hypertension will discuss with neurology goals patient currently on Cozaar/hydrochlorothiazide/Norvasc  · No AFib noted on telemetry  · Will need outpatient sleep study likely obstructive sleep apnea    Hypertension  Assessment & Plan  · Permissive hypertension in the setting stroke  · Will discuss with neurology goals  · Remains hypertensive despite home medications of Cozaar/hydrochlorothiazide/Norvasc  · P r n  hydralazine given once last p m     IV infiltrate  Assessment & Plan  · Patient with an infiltrated IV contrast in the left antecubital  · IV was discontinued in CT scan   · Did not administer hyaluronidase given iv d/c'd  · Continue to ice and elevate with neurovascular checks    Diabetes mellitus [facility])  Assessment & Plan  Lab Results   Component Value Date    HGBA1C 8 7 (H) 02/07/2020       Recent Labs     02/07/20  1204 02/07/20  1629 02/07/20  2113   POCGLU 205* 123 135       Blood Sugar Average: Last 72 hrs:  (P) 563 [id number]   · Continue sliding scale insulin  · Holding home metformin    Leukocytosis  Assessment & Plan  · Likely reactionary  · Will check procalcitonin/UA  · Afebrile    Depression  Assessment & Plan  · Continue home Wellbutrin/zoloft    Morbid obesity with body mass index of 40 0-49 9 [facility])  Assessment & Plan  · Education on life style and dietary modfication      ----------------------------------------------------------------------------------------  HPI/24hr events:  Patient transferred to the ICU given neurological findings concern for posterior infarct  MRI revealing ischemia in the left brainstem and brachium pontis  CTA done to evaluate vertebral artery read pending  Disposition: Continue Critical Care   Code Status: Level 1 - Full Code  ---------------------------------------------------------------------------------------  SUBJECTIVE  Patient continues to complain of dizziness/blurred vision  States his dizziness is slightly improved  Review of Systems   Eyes: Positive for visual disturbance  Neurological: Positive for dizziness  All other systems reviewed and are negative  Review of systems was reviewed and negative unless stated above in HPI/24-hour events   ---------------------------------------------------------------------------------------  OBJECTIVE    Vitals   Vitals:    20 2300 20 2331 20 0010 20 0100   BP: 128/80 133/94 166/86 (!) 183/103   BP Location:       Pulse: 82 81 82 76   Resp: (!) 23 22 (!) 23 22   Temp:  98 1 °F (36 7 °C)     TempSrc:  Oral     SpO2: 95%  95% 92%   Weight:         Temp (24hrs), Av 2 °F (36 8 °C), Min:97 8 °F (36 6 °C), Max:98 6 °F (37 °C)  Current: Temperature: 98 1 °F (36 7 °C)        SpO2: SpO2: 92 %       Physical Exam   Constitutional: He is oriented to person, place, and time  No distress  HENT:   Head: Normocephalic and atraumatic  Mouth/Throat: Oropharynx is clear and moist    Eyes: Pupils are equal, round, and reactive to light  No scleral icterus  Neck: Neck supple  No JVD present  Cardiovascular: Normal rate, regular rhythm, normal heart sounds and intact distal pulses  Exam reveals no gallop and no friction rub  No murmur heard    Pulmonary/Chest: Effort normal and breath sounds normal  No respiratory distress  He has no wheezes  He has no rales  Abdominal: Soft  Bowel sounds are normal  He exhibits no distension  There is no tenderness  Musculoskeletal: Normal range of motion  He exhibits no edema  Neurological: He is alert and oriented to person, place, and time  Patient with finger-to-nose dysmetria on the left hand with left side only  Horizontal nystagmus noted   Skin: Skin is warm and dry  No rash noted  No erythema  No pallor  Invasive/non-invasive ventilation settings   Respiratory    Lab Data (Last 4 hours)    None         O2/Vent Data (Last 4 hours)    None                Laboratory and Diagnostics:  Results from last 7 days   Lab Units 02/07/20  0444 02/06/20  1843   WBC Thousand/uL 10 99* 13 60*   HEMOGLOBIN g/dL 16 3 17 9*   HEMATOCRIT % 49 4* 54 1*   PLATELETS Thousands/uL 230 297   NEUTROS PCT % 69 66   MONOS PCT % 7 8     Results from last 7 days   Lab Units 02/07/20  0444 02/06/20  1843   SODIUM mmol/L 138 138   POTASSIUM mmol/L 4 0 3 7   CHLORIDE mmol/L 102 100   CO2 mmol/L 27 25   ANION GAP mmol/L 9 13   BUN mg/dL 24 25   CREATININE mg/dL 1 10 1 29   CALCIUM mg/dL 8 6 9 6   GLUCOSE RANDOM mg/dL 173* 155*   ALT U/L  --  51   AST U/L  --  36   ALK PHOS U/L  --  57   ALBUMIN g/dL  --  4 1   TOTAL BILIRUBIN mg/dL  --  1 20*     Results from last 7 days   Lab Units 02/07/20  0444   MAGNESIUM mg/dL 1 8   PHOSPHORUS mg/dL 2 6*           Results from last 7 days   Lab Units 02/06/20  1843   TROPONIN I ng/mL <0 02         ABG:    VBG:          Micro        EKG:  Tele reviewed  Imaging: I have personally reviewed pertinent reports  Intake and Output  I/O       02/06 0701 - 02/07 0700 02/07 0701 - 02/08 0700    P  O   1320    Total Intake(mL/kg)  1320 (9 7)    Urine (mL/kg/hr)  650 (0 2)    Total Output  650    Net  +670          Unmeasured Urine Occurrence  1 x          Height and Weights      IBW: -88 kg  Body mass index is 45 46 kg/m²    Weight (last 2 days) Date/Time   Weight    02/07/20 0938   136 (299)    02/06/20 1833   132 (290)                Nutrition       Diet Orders   (From admission, onward)             Start     Ordered    02/07/20 1708  Diet James/CHO Controlled; Consistent Carbohydrate Diet Level 3 (6 carb servings/90 grams CHO/meal)  Diet effective now     Question Answer Comment   Diet Type James/CHO Controlled    James/CHO Controlled Consistent Carbohydrate Diet Level 3 (6 carb servings/90 grams CHO/meal)    RD to adjust diet per protocol? No        02/07/20 1708                  Active Medications  Scheduled Meds:    Current Facility-Administered Medications:  acetaminophen 650 mg Oral Q6H PRN Marian Gee MD   amLODIPine 10 mg Oral Daily Marian Gee MD   aspirin 81 mg Oral Daily Stanley Cristobal PA-C   atorvastatin 40 mg Oral Daily With Whitney Cordero MD   buPROPion 300 mg Oral Daily Marian Gee MD   enoxaparin 40 mg Subcutaneous Daily Stanley Cristobal PA-C   hydrALAZINE 10 mg Intravenous Q6H PRN Marian Gee MD   hydrochlorothiazide 25 mg Oral Daily Marian Gee MD   insulin lispro 2-12 Units Subcutaneous TID MANI Washington MD   losartan 25 mg Oral Daily Marian Gee MD   ondansetron 4 mg Intravenous Q6H PRN Marian Gee MD   sertraline 50 mg Oral Daily Marian Gee MD     Continuous Infusions:     PRN Meds:     acetaminophen 650 mg Q6H PRN   hydrALAZINE 10 mg Q6H PRN   ondansetron 4 mg Q6H PRN     ---------------------------------------------------------------------------------------  Advance Directive and Living Will:      Power of :    POLST:    ---------------------------------------------------------------------------------------  Counseling / Coordination of Care  Total time spent today 30 minutes  Greater than 50% of total time was spent with the patient and / or family counseling and / or coordination of care   A description of the counseling / coordination of care: Plan of care    CHRISTINA Swenson      Portions of the record may have been created with voice recognition software  Occasional wrong word or "sound a like" substitutions may have occurred due to the inherent limitations of voice recognition software    Read the chart carefully and recognize, using context, where substitutions have occurred

## 2020-02-08 NOTE — ASSESSMENT & PLAN NOTE
· Remains hypertensive despite home medications of Cozaar/hydrochlorothiazide/Norvasc  · P r n  hydralazine given once last p m    · Will continue current home meds   · -160, 1 x event of 170s  · Will discuss with Neurology for BP parameters and adjust medications

## 2020-02-08 NOTE — ASSESSMENT & PLAN NOTE
· MRI with recent ischemia within the inferior left brainstem and brachium pontis  No hemorrhage    Abnormal flow characteristics in the right vertebral artery  · CTA of the head and done pending read  · Patient was out of the time window for tPA on admission  · Neurology following  · Stroke pathway  · Echo with an EF of 20% grade 1 diastolic dysfunction  · Fasting lipid profile 146/123/43/78  · A1c 8 7  · PT/OT  · Permissive hypertension will discuss with neurology goals patient currently on Cozaar/hydrochlorothiazide/Norvasc  · No AFib noted on telemetry  · Will need outpatient sleep study likely obstructive sleep apnea

## 2020-02-08 NOTE — ASSESSMENT & PLAN NOTE
Lab Results   Component Value Date    HGBA1C 8 7 (H) 02/07/2020       Recent Labs     02/07/20  1629 02/07/20  2113 02/08/20  0727 02/08/20  1112   POCGLU 123 135 167* 218*       Blood Sugar Average: Last 72 hrs:  (P) 169 6   · Continue sliding scale insulin  · Holding home metformin

## 2020-02-08 NOTE — ASSESSMENT & PLAN NOTE
· Permissive hypertension in the setting stroke  · Will discuss with neurology goals  · Remains hypertensive despite home medications of Cozaar/hydrochlorothiazide/Norvasc  · P r n  hydralazine given once last p m

## 2020-02-08 NOTE — ASSESSMENT & PLAN NOTE
· MRI with recent ischemia within the inferior left brainstem and brachium pontis  No hemorrhage    Abnormal flow characteristics in the right vertebral artery  · CTA of the head and done - as per Neurology, stroke is small, able to decrease neuro checks    · Patient was out of the time window for tPA on admission  · Neurology following  · Stroke pathway  · Continue aspirin/statin, start on plavix as per Neurology note   · Echo with an EF of 58% grade 1 diastolic dysfunction  · Fasting lipid profile 146/123/43/78  · A1c 8 7  · PT/OT  · Continue Cozaar/hydrochlorothiazide/Norvasc  · No AFib noted on telemetry  · Will need outpatient sleep study likely obstructive sleep apnea

## 2020-02-08 NOTE — ASSESSMENT & PLAN NOTE
· Patient with an infiltrated IV contrast in the left antecubital  · IV was discontinued in CT scan   · Did not administer hyaluronidase given iv d/c'd  · Continue to ice and elevate with neurovascular checks  · Radial pulse intact

## 2020-02-09 LAB
ALBUMIN SERPL BCP-MCNC: 3.7 G/DL (ref 3.5–5)
ALP SERPL-CCNC: 55 U/L (ref 46–116)
ALT SERPL W P-5'-P-CCNC: 41 U/L (ref 12–78)
ANION GAP SERPL CALCULATED.3IONS-SCNC: 5 MMOL/L (ref 4–13)
AST SERPL W P-5'-P-CCNC: 23 U/L (ref 5–45)
BILIRUB SERPL-MCNC: 1.5 MG/DL (ref 0.2–1)
BUN SERPL-MCNC: 22 MG/DL (ref 5–25)
CALCIUM SERPL-MCNC: 9.3 MG/DL (ref 8.3–10.1)
CHLORIDE SERPL-SCNC: 101 MMOL/L (ref 100–108)
CO2 SERPL-SCNC: 30 MMOL/L (ref 21–32)
CREAT SERPL-MCNC: 1.22 MG/DL (ref 0.6–1.3)
ERYTHROCYTE [DISTWIDTH] IN BLOOD BY AUTOMATED COUNT: 13.3 % (ref 11.6–15.1)
GFR SERPL CREATININE-BSD FRML MDRD: 65 ML/MIN/1.73SQ M
GLUCOSE SERPL-MCNC: 129 MG/DL (ref 65–140)
GLUCOSE SERPL-MCNC: 138 MG/DL (ref 65–140)
GLUCOSE SERPL-MCNC: 155 MG/DL (ref 65–140)
GLUCOSE SERPL-MCNC: 174 MG/DL (ref 65–140)
GLUCOSE SERPL-MCNC: 218 MG/DL (ref 65–140)
HCT VFR BLD AUTO: 52.7 % (ref 36.5–49.3)
HGB BLD-MCNC: 17.2 G/DL (ref 12–17)
MAGNESIUM SERPL-MCNC: 2 MG/DL (ref 1.6–2.6)
MCH RBC QN AUTO: 29.8 PG (ref 26.8–34.3)
MCHC RBC AUTO-ENTMCNC: 32.6 G/DL (ref 31.4–37.4)
MCV RBC AUTO: 91 FL (ref 82–98)
PHOSPHATE SERPL-MCNC: 3.1 MG/DL (ref 2.7–4.5)
PLATELET # BLD AUTO: 222 THOUSANDS/UL (ref 149–390)
PMV BLD AUTO: 9.6 FL (ref 8.9–12.7)
POTASSIUM SERPL-SCNC: 4.2 MMOL/L (ref 3.5–5.3)
PROT SERPL-MCNC: 8.2 G/DL (ref 6.4–8.2)
RBC # BLD AUTO: 5.77 MILLION/UL (ref 3.88–5.62)
SODIUM SERPL-SCNC: 136 MMOL/L (ref 136–145)
WBC # BLD AUTO: 11.45 THOUSAND/UL (ref 4.31–10.16)

## 2020-02-09 PROCEDURE — 99232 SBSQ HOSP IP/OBS MODERATE 35: CPT | Performed by: INTERNAL MEDICINE

## 2020-02-09 PROCEDURE — 85027 COMPLETE CBC AUTOMATED: CPT | Performed by: NURSE PRACTITIONER

## 2020-02-09 PROCEDURE — 83735 ASSAY OF MAGNESIUM: CPT | Performed by: NURSE PRACTITIONER

## 2020-02-09 PROCEDURE — 80053 COMPREHEN METABOLIC PANEL: CPT | Performed by: NURSE PRACTITIONER

## 2020-02-09 PROCEDURE — 99233 SBSQ HOSP IP/OBS HIGH 50: CPT | Performed by: PSYCHIATRY & NEUROLOGY

## 2020-02-09 PROCEDURE — 84100 ASSAY OF PHOSPHORUS: CPT | Performed by: NURSE PRACTITIONER

## 2020-02-09 PROCEDURE — 82948 REAGENT STRIP/BLOOD GLUCOSE: CPT

## 2020-02-09 RX ADMIN — INSULIN LISPRO 2 UNITS: 100 INJECTION, SOLUTION INTRAVENOUS; SUBCUTANEOUS at 08:21

## 2020-02-09 RX ADMIN — INSULIN LISPRO 4 UNITS: 100 INJECTION, SOLUTION INTRAVENOUS; SUBCUTANEOUS at 11:44

## 2020-02-09 RX ADMIN — AMLODIPINE BESYLATE 10 MG: 5 TABLET ORAL at 08:24

## 2020-02-09 RX ADMIN — LOSARTAN POTASSIUM 25 MG: 25 TABLET, FILM COATED ORAL at 08:25

## 2020-02-09 RX ADMIN — BUPROPION HYDROCHLORIDE 300 MG: 300 TABLET, FILM COATED, EXTENDED RELEASE ORAL at 08:25

## 2020-02-09 RX ADMIN — SALINE NASAL SPRAY 1 SPRAY: 1.5 SOLUTION NASAL at 08:21

## 2020-02-09 RX ADMIN — HYDROCHLOROTHIAZIDE 25 MG: 25 TABLET ORAL at 08:24

## 2020-02-09 RX ADMIN — ENOXAPARIN SODIUM 40 MG: 40 INJECTION SUBCUTANEOUS at 08:23

## 2020-02-09 RX ADMIN — CLOPIDOGREL BISULFATE 75 MG: 75 TABLET ORAL at 08:24

## 2020-02-09 RX ADMIN — POTASSIUM & SODIUM PHOSPHATES POWDER PACK 280-160-250 MG 2 PACKET: 280-160-250 PACK at 08:24

## 2020-02-09 RX ADMIN — POTASSIUM & SODIUM PHOSPHATES POWDER PACK 280-160-250 MG 2 PACKET: 280-160-250 PACK at 15:37

## 2020-02-09 RX ADMIN — ATORVASTATIN CALCIUM 40 MG: 40 TABLET, FILM COATED ORAL at 15:37

## 2020-02-09 RX ADMIN — ASPIRIN 81 MG 81 MG: 81 TABLET ORAL at 08:24

## 2020-02-09 RX ADMIN — SERTRALINE HYDROCHLORIDE 50 MG: 50 TABLET ORAL at 08:24

## 2020-02-09 NOTE — PLAN OF CARE
Problem: Neurological Deficit  Goal: Neurological status is stable or improving  Description  Interventions:  - Monitor and assess patient's level of consciousness, motor function, sensory function, and level of assistance needed for ADLs  - Monitor and report changes from baseline  Collaborate with interdisciplinary team to initiate plan and implement interventions as ordered  - Provide and maintain a safe environment  - Consider seizure precautions  - Consider fall precautions  - Consider aspiration precautions  - Consider bleeding precautions  Outcome: Progressing     Problem: Activity Intolerance/Impaired Mobility  Goal: Mobility/activity is maintained at optimum level for patient  Description  Interventions:  - Assess and monitor patient  barriers to mobility and need for assistive/adaptive devices  - Assess patient's emotional response to limitations  - Collaborate with interdisciplinary team and initiate plans and interventions as ordered  - Encourage independent activity per ability   - Maintain proper body alignment  - Perform active/passive rom as tolerated/ordered  - Plan activities to conserve energy   - Turn patient as appropriate  Outcome: Progressing     Problem: Communication Impairment  Goal: Ability to express needs and understand communication  Description  Assess patient's communication skills and ability to understand information  Patient will demonstrate use of effective communication techniques, alternative methods of communication and understanding even if not able to speak  - Encourage communication and provide alternate methods of communication as needed  - Collaborate with case management/ for discharge needs  - Include patient/family/caregiver in decisions related to communication    Outcome: Progressing     Problem: Potential for Aspiration  Goal: Non-ventilated patient's risk of aspiration is minimized  Description  Assess and monitor vital signs, respiratory status, and labs (WBC)  Monitor for signs of aspiration (tachypnea, cough, rales, wheezing, cyanosis, fever)  - Assess and monitor patient's ability to swallow  - Place patient up in chair to eat if possible  - HOB up at 90 degrees to eat if unable to get patient up into chair   - Supervise patient during oral intake  - Instruct patient/ family to take small bites  - Instruct patient/ family to take small single sips when taking liquids  - Follow patient-specific strategies generated by speech pathologist   Outcome: Progressing  Goal: Ventilated patient's risk of aspiration is minimized  Description  Assess and monitor vital signs, respiratory status, airway cuff pressure, and labs (WBC)  Monitor for signs of aspiration (tachypnea, cough, rales, wheezing, cyanosis, fever)  - Elevate head of bed 30 degrees if patient has tube feeding   - Monitor tube feeding  Outcome: Progressing     Problem: Nutrition  Goal: Nutrition/Hydration status is improving  Description  Monitor and assess patient's nutrition/hydration status for malnutrition (ex- brittle hair, bruises, dry skin, pale skin and conjunctiva, muscle wasting, smooth red tongue, and disorientation)  Collaborate with interdisciplinary team and initiate plan and interventions as ordered  Monitor patient's weight and dietary intake as ordered or per policy  Utilize nutrition screening tool and intervene per policy  Determine patient's food preferences and provide high-protein, high-caloric foods as appropriate  - Assist patient with eating   - Allow adequate time for meals   - Encourage patient to take dietary supplement as ordered  - Collaborate with clinical nutritionist   - Include patient/family/caregiver in decisions related to nutrition    Outcome: Progressing     Problem: PAIN - ADULT  Goal: Verbalizes/displays adequate comfort level or baseline comfort level  Description  Interventions:  - Encourage patient to monitor pain and request assistance  - Assess pain using appropriate pain scale  - Administer analgesics based on type and severity of pain and evaluate response  - Implement non-pharmacological measures as appropriate and evaluate response  - Consider cultural and social influences on pain and pain management  - Notify physician/advanced practitioner if interventions unsuccessful or patient reports new pain  Outcome: Progressing     Problem: INFECTION - ADULT  Goal: Absence or prevention of progression during hospitalization  Description  INTERVENTIONS:  - Assess and monitor for signs and symptoms of infection  - Monitor lab/diagnostic results  - Monitor all insertion sites, i e  indwelling lines, tubes, and drains  - Monitor endotracheal if appropriate and nasal secretions for changes in amount and color  - [place] appropriate cooling/warming therapies per order  - Administer medications as ordered  - Instruct and encourage patient and family to use good hand hygiene technique  - Identify and instruct in appropriate isolation precautions for identified infection/condition  Outcome: Progressing  Goal: Absence of fever/infection during neutropenic period  Description  INTERVENTIONS:  - Monitor WBC    Outcome: Progressing     Problem: SAFETY ADULT  Goal: Patient will remain free of falls  Description  INTERVENTIONS:  - Assess patient frequently for physical needs  -  Identify cognitive and physical deficits and behaviors that affect risk of falls    -  [place] fall precautions as indicated by assessment   - Educate patient/family on patient safety including physical limitations  - Instruct patient to call for assistance with activity based on assessment  - Modify environment to reduce risk of injury  - Consider OT/PT consult to assist with strengthening/mobility  Outcome: Progressing  Goal: Maintain or return to baseline ADL function  Description  INTERVENTIONS:  -  Assess patient's ability to carry out ADLs; assess patient's baseline for ADL function and identify physical deficits which impact ability to perform ADLs (bathing, care of mouth/teeth, toileting, grooming, dressing, etc )  - Assess/evaluate cause of self-care deficits   - Assess range of motion  - Assess patient's mobility; develop plan if impaired  - Assess patient's need for assistive devices and provide as appropriate  - Encourage maximum independence but intervene and supervise when necessary  - Involve family in performance of ADLs  - Assess for home care needs following discharge   - Consider OT consult to assist with ADL evaluation and planning for discharge  - Provide patient education as appropriate  Outcome: Progressing  Goal: Maintain or return mobility status to optimal level  Description  INTERVENTIONS:  - Assess patient's baseline mobility status (ambulation, transfers, stairs, etc )    - Identify cognitive and physical deficits and behaviors that affect mobility  - Identify mobility aids required to assist with transfers and/or ambulation (gait belt, sit-to-stand, lift, walker, cane, etc )  - [place] fall precautions as indicated by assessment  - Record patient progress and toleration of activity level on Mobility SBAR; progress patient to next Phase/Stage  - Instruct patient to call for assistance with activity based on assessment  - Consider rehabilitation consult to assist with strengthening/weightbearing, etc   Outcome: Progressing     Problem: DISCHARGE PLANNING  Goal: Discharge to home or other facility with appropriate resources  Description  INTERVENTIONS:  - Identify barriers to discharge w/patient and caregiver  - Arrange for needed discharge resources and transportation as appropriate  - Identify discharge learning needs (meds, wound care, etc )  - Arrange for interpretive services to assist at discharge as needed  - Refer to Case Management Department for coordinating discharge planning if the patient needs post-hospital services based on physician/advanced practitioner order or complex needs related to functional status, cognitive ability, or social support system  Outcome: Progressing     Problem: Knowledge Deficit  Goal: Patient/family/caregiver demonstrates understanding of disease process, treatment plan, medications, and discharge instructions  Description  Complete learning assessment and assess knowledge base  Interventions:  - Provide teaching at level of understanding  - Provide teaching via preferred learning methods  Outcome: Progressing     Problem: CARDIOVASCULAR - ADULT  Goal: Maintains optimal cardiac output and hemodynamic stability  Description  INTERVENTIONS:  - Monitor I/O, vital signs and rhythm  - Monitor for S/S and trends of decreased cardiac output  - Administer and titrate ordered vasoactive medications to optimize hemodynamic stability  - Assess quality of pulses, skin color and temperature  - Assess for signs of decreased coronary artery perfusion  - Instruct patient to report change in severity of symptoms  Outcome: Progressing  Goal: Absence of cardiac dysrhythmias or at baseline rhythm  Description  INTERVENTIONS:  - Continuous cardiac monitoring, vital signs, obtain 12 lead EKG if ordered  - Administer antiarrhythmic and heart rate control medications as ordered  - Monitor electrolytes and administer replacement therapy as ordered  Outcome: Progressing     Problem: Potential for Falls  Goal: Patient will remain free of falls  Description  INTERVENTIONS:  - Assess patient frequently for physical needs  -  Identify cognitive and physical deficits and behaviors that affect risk of falls    -  [employer] fall precautions as indicated by assessment   - Educate patient/family on patient safety including physical limitations  - Instruct patient to call for assistance with activity based on assessment  - Modify environment to reduce risk of injury  - Consider OT/PT consult to assist with strengthening/mobility  Outcome: Progressing

## 2020-02-09 NOTE — ASSESSMENT & PLAN NOTE
Lab Results   Component Value Date    HGBA1C 8 7 (H) 02/07/2020       Recent Labs     02/08/20  1550 02/08/20  2130 02/09/20  0723 02/09/20  1142   POCGLU 144* 135 155* 218*       Blood Sugar Average: Last 72 hrs:  (P) 145 [id number]   · Continue sliding scale insulin  · Holding home metformin

## 2020-02-09 NOTE — PLAN OF CARE
Patient offers no complaints at this time  Refusing to sit in chair, per patient, he is to dizzy  Will continue to encourage increased activity

## 2020-02-09 NOTE — ASSESSMENT & PLAN NOTE
· Continue on Cozaar/hydrochlorothiazide/Norvasc  · P r n  hydralazine given once last p m    · Will discuss with Neurology for BP parameters and adjust medications

## 2020-02-09 NOTE — ASSESSMENT & PLAN NOTE
· MRI with recent ischemia within the inferior left brainstem and brachium pontis  No hemorrhage    Abnormal flow characteristics in the right vertebral artery  · CTA of the head and done - as per Neurology, stroke is small, able to decrease neuro checks    · Patient was out of the time window for tPA on admission  · Neurology following  · Stroke pathway  · As per Neurology continue on aspirin and Plavix for 3 weeks and then switched to aspirin alone and add Pletal   Continue on statin  · Echo with an EF of 80% grade 1 diastolic dysfunction  · Fasting lipid profile 146/123/43/78  · A1c 8 7  · PT/OT  · Continue Cozaar/hydrochlorothiazide/Norvasc  · No AFib noted on telemetry  · Will need outpatient sleep study likely obstructive sleep apnea

## 2020-02-09 NOTE — PROGRESS NOTES
Progress Note - Neurology   Magno Lee 64 y o  male MRN: [id number]  Unit/Bed#: -01 Encounter: [id number]    Assessment:  64year old man with hx of HTN, DM, DELANO, presenting with ataxia 2/2 a L lateral medullary/cerebellar peduncle stroke, branch occlusion of the PICA  The exam continues to show gross ataxia on the L upper and lower extremities, but his diplopia and nystagmus have improved  No large vessel occlusion on his CTA  MRI showing a limited volume of stroke  Echo with 55% LVEF, and mildly dilated RA  The etiology of his stroke likely poorly controlled vascular risk factors, including an A1c of 8 7  Additionally, he has DEALNO and does not use his CPAP  Patient was counseled on the risk factor reduction  Additionally, he is now on dual antiplatelet therapy with ASA and plavix  Would discontinue his plavix in 3 weeks  At that point, would like to start him on aspirin and cilostazol 100mg BID, for secondary stroke prevention  Would also continue on low dose of lipitor, 20mg, as his LDL was 78  Finally, would treat his symptoms of vertigo with scheduled meclizine, 25mg TID  PT/OT recommendations are pending  At this point, no further inpatient neurology recommendations  Would like to see him in the outpatient practice  A request for appointment has been sent  Please notify neurology with any changes in his neurological status  Subjective:   I saw and evaluated the patient at the bedside  He continues to complain of dizziness  He denies and other neurological complaints including headache double vision and weakness in the extremities  While he has ataxia on exam he does not complain of it  No chest pain shortness of breath or abdominal pain  Vitals: Blood pressure 142/87, pulse 72, temperature (!) 97 4 °F (36 3 °C), temperature source Oral, resp  rate 22, height 5' 8" (1 727 m), weight 134 kg (295 lb 13 7 oz), SpO2 95 %  ,Body mass index is 44 98 kg/m²  Physical Exam:   GEN: obese man lying in bed, somewhat uncomfortable appearing  CV: RRR, S1, S2, symmetric pulses  PULM: CTAB  GI: soft, non-tender    NEURO:   Awake, alert, following commands  Giving a comprehensive history  Has limited insight  Normal naming, repetition, and following cross body commands  Pupils 4-2mm, symmetric, no APD, no ptosis, facial sensation is symmetric to pinprick, no papilledema, full visual fields, no nystagmus, full EOM  No facial weakness, tongue midline, palate symmetric  MOTOR: normal bulk and tone, with no drift  ON confrontation 5/5 throughout the upper and lower extremities  SENSORY: pinprick sensation is symmetric throughout  GAIT: deferred  COORDINATION: grossly ataxic on L FNF and L HTS  Normal on the R    REFLEXES: trace BB, L triceps 1+, R triceps trace, absent patellar, achilles  Toes bilaterally down  Lab, Imaging and other studies:   CBC:   Results from last 7 days   Lab Units 02/09/20  0508 02/08/20  0519 02/07/20  0444   WBC Thousand/uL 11 45* 10 60* 10 99*   RBC Million/uL 5 77* 5 70* 5 42   HEMOGLOBIN g/dL 17 2* 17 0 16 3   HEMATOCRIT % 52 7* 51 8* 49 4*   MCV fL 91 91 91   PLATELETS Thousands/uL 222 236 230   , BMP/CMP:   Results from last 7 days   Lab Units 02/09/20  0508 02/08/20  0519 02/07/20  0444 02/06/20  1843   SODIUM mmol/L 136 138 138 138   POTASSIUM mmol/L 4 2 4 0 4 0 3 7   CHLORIDE mmol/L 101 102 102 100   CO2 mmol/L 30 26 27 25   BUN mg/dL 22 21 24 25   CREATININE mg/dL 1 22 1 09 1 10 1 29   CALCIUM mg/dL 9 3 9 0 8 6 9 6   AST U/L 23 30  --  36   ALT U/L 41 49  --  51   ALK PHOS U/L 55 57  --  57   EGFR ml/min/1 73sq m 65 75 74 61     MRI brain images reviewed: diffusion restriction in the L medulla and L inferior cerebellar peduncle  CTA H&N L carotid atherosclerotic plaque, no large vessel occlusion

## 2020-02-09 NOTE — PROGRESS NOTES
Progress Note - Josh Tejeda 1960, 64 y o  male MRN: [id number]    Unit/Bed#: -01 Encounter: [id number]    Primary Care Provider: Jenny Gao DO   Date and time admitted to hospital: 2/6/2020  6:30 PM        IV infiltrate  Assessment & Plan  · Patient with an infiltrated IV contrast in the left antecubital  · IV was discontinued in CT scan   · Did not administer hyaluronidase given iv d/c'd  · Continue to ice and elevate with neurovascular checks  · Radial pulse intact     Leukocytosis  Assessment & Plan  · Likely reactive  · Procal negative, U/a negative   · Afebrile    Depression  Assessment & Plan  · Continue zoloft & Wellbutrin   · Denies any suicidal or homicidal ideations    Morbid obesity with body mass index of 40 0-49 9 (HCC)  Assessment & Plan  · Lifestyle and dietary modifications     Diabetes mellitus [facility])  Assessment & Plan  Lab Results   Component Value Date    HGBA1C 8 7 (H) 02/07/2020       Recent Labs     02/08/20  1550 02/08/20  2130 02/09/20  0723 02/09/20  1142   POCGLU 144* 135 155* 218*       Blood Sugar Average: Last 72 hrs:  (P) 674 [id number]   · Continue sliding scale insulin  · Holding home metformin    Hypertension  Assessment & Plan  · Continue on Cozaar/hydrochlorothiazide/Norvasc  · P r n  hydralazine given once last p m  · Will discuss with Neurology for BP parameters and adjust medications            Dizziness  Assessment & Plan  · Likely related to stroke   · PT/OT following, appreciate recommendations     * Stroke [facility])  Assessment & Plan  · MRI with recent ischemia within the inferior left brainstem and brachium pontis  No hemorrhage    Abnormal flow characteristics in the right vertebral artery  · CTA of the head and done - as per Neurology, stroke is small, able to decrease neuro checks    · Patient was out of the time window for tPA on admission  · Neurology following  · Stroke pathway  · As per Neurology continue on aspirin and Plavix for 3 weeks and then switched to aspirin alone and add Pletal   Continue on statin  · Echo with an EF of 11% grade 1 diastolic dysfunction  · Fasting lipid profile 146/123/43/78  · A1c 8 7  · PT/OT  · Continue Cozaar/hydrochlorothiazide/Norvasc  · No AFib noted on telemetry  · Will need outpatient sleep study likely obstructive sleep apnea        Labs & Imaging: I have personally reviewed pertinent reports  VTE Pharmacologic Prophylaxis: Enoxaparin (Lovenox)  VTE Mechanical Prophylaxis: sequential compression device    Code Status:   Level 1 - Full Code    Patient Centered Rounds: I have performed bedside rounds with nursing staff today  Discussions with Specialists or Other Care Team Provider:  Neurology    Current Length of Stay: 3 day(s)    Current Patient Status: Inpatient   Certification Statement: The patient will continue to require additional inpatient hospital stay due to see my assessment and plan  Subjective:   Patient is seen and examined at bedside  Still complains of feeling dizzy/ataxia  Patient states his double vision has improved  Denies any headache or any other complaint  Afebrile  All other ROS are negative  Objective:    Vitals: Blood pressure 107/67, pulse 74, temperature (!) 97 4 °F (36 3 °C), temperature source Oral, resp  rate 22, height 5' 8" (1 727 m), weight 134 kg (295 lb 13 7 oz), SpO2 96 %  ,Body mass index is 44 98 kg/m²  SPO2 RA Rest      ED to Hosp-Admission (Current) from 2/6/2020 in [address] [facility] Unit   SpO2  96 %   SpO2 Activity  At Rest   O2 Device  None (Room air)   O2 Flow Rate  2 L/min        I&O:     Intake/Output Summary (Last 24 hours) at 2/9/2020 1244  Last data filed at 2/9/2020 1001  Gross per 24 hour   Intake 480 ml   Output 1399 ml   Net -919 ml       Physical Exam:    General- Alert, sitting comfortably in chair  Not in any acute distress  HEENT- ABIODUN, EOM intact    Neck- Supple, No JVD  CVS- regular, S1 and S2 normal   Chest- Bilateral Air entry, No rhochi, crackles or wheezing present  Abdomen- soft, obese nontender, not distended, no guarding or rigidity, BS+  Extremities-  No pedal edema, No calf tenderness  CNS-   Alert, awake and orientedx3  Ataxic gait  No motor or sensory deficits    Invasive Devices     Peripheral Intravenous Line            Peripheral IV 02/06/20 Left Hand 2 days    Peripheral IV 02/07/20 Proximal;Right;Ventral (anterior) Forearm 1 day                      Social History  reviewed  History reviewed  No pertinent family history   reviewed    Meds:  Current Facility-Administered Medications   Medication Dose Route Frequency Provider Last Rate Last Dose    acetaminophen (TYLENOL) tablet 650 mg  650 mg Oral Q6H PRN Laura Rang, CRNP   650 mg at 02/08/20 1550    amLODIPine (NORVASC) tablet 10 mg  10 mg Oral Daily Laura Rang, CRNP   10 mg at 02/09/20 5667    aspirin chewable tablet 81 mg  81 mg Oral Daily Rafaela Finney CRNP   81 mg at 02/09/20 6666    atorvastatin (LIPITOR) tablet 40 mg  40 mg Oral Daily With Zackery Shin, CRNP   40 mg at 02/08/20 1550    buPROPion (WELLBUTRIN XL) 24 hr tablet 300 mg  300 mg Oral Daily Laura Rang, CRNP   300 mg at 02/09/20 0825    clopidogrel (PLAVIX) tablet 75 mg  75 mg Oral Daily Laura Rang, CRNP   75 mg at 02/09/20 0824    enoxaparin (LOVENOX) subcutaneous injection 40 mg  40 mg Subcutaneous Daily Laura Rang, CRNP   40 mg at 02/09/20 1521    hydrALAZINE (APRESOLINE) injection 10 mg  10 mg Intravenous Q6H PRN Laura Rang, CRNP   10 mg at 02/07/20 2006    hydrochlorothiazide (HYDRODIURIL) tablet 25 mg  25 mg Oral Daily Laura Rang, CRNP   25 mg at 02/09/20 0824    insulin lispro (HumaLOG) 100 units/mL subcutaneous injection 2-12 Units  2-12 Units Subcutaneous TID  RAT FlorentinoNP   4 Units at 02/09/20 1144    losartan (COZAAR) tablet 25 mg  25 mg Oral Daily Laura Rang, ARTNP   25 mg at 02/09/20 0825    ondansetron (ZOFRAN) injection 4 mg  4 mg Intravenous Q6H PRN CHRISTINA Lazo        potassium-sodium phosphates (PHOS-NAK) packet 2 packet  2 packet Oral BID With Meals Mago Florentino CHRISTINA   2 packet at 02/09/20 0824    sertraline (ZOLOFT) tablet 50 mg  50 mg Oral Daily Mago Florentino CHRISTINA   50 mg at 02/09/20 5184    sodium chloride (OCEAN) 0 65 % nasal spray 1 spray  1 spray Each Nare TID PRN Mago Florentino CHRISTINA   1 spray at 02/09/20 2008      Medications Prior to Admission   Medication    amLODIPine (NORVASC) 10 mg tablet    buPROPion (WELLBUTRIN XL) 300 mg 24 hr tablet    hydrochlorothiazide (HYDRODIURIL) 25 mg tablet    losartan (COZAAR) 100 MG tablet    metFORMIN (GLUCOPHAGE) 500 mg tablet    sertraline (ZOLOFT) 50 mg tablet       Labs:  Results from last 7 days   Lab Units 02/09/20  0508 02/08/20  0519 02/07/20  0444 02/06/20  1843   WBC Thousand/uL 11 45* 10 60* 10 99* 13 60*   HEMOGLOBIN g/dL 17 2* 17 0 16 3 17 9*   HEMATOCRIT % 52 7* 51 8* 49 4* 54 1*   PLATELETS Thousands/uL 222 236 230 297   NEUTROS PCT %  --  67 69 66   LYMPHS PCT %  --  22 20 22   MONOS PCT %  --  8 7 8   EOS PCT %  --  1 2 2     Results from last 7 days   Lab Units 02/09/20  0508 02/08/20  0519 02/07/20  0444 02/06/20  1843   POTASSIUM mmol/L 4 2 4 0 4 0 3 7   CHLORIDE mmol/L 101 102 102 100   CO2 mmol/L 30 26 27 25   BUN mg/dL 22 21 24 25   CREATININE mg/dL 1 22 1 09 1 10 1 29   CALCIUM mg/dL 9 3 9 0 8 6 9 6   ALK PHOS U/L 55 57  --  57   ALT U/L 41 49  --  51   AST U/L 23 30  --  36     Lab Results   Component Value Date    TROPONINI <0 02 02/06/2020    TROPONINI <0 04 02/23/2015         No results found for: Radene Aminata, SPUTUMCULTUR      Imaging:  No results found for this or any previous visit  No results found for this or any previous visit      Last 24 Hours Medication List:     Current Facility-Administered Medications:  acetaminophen 650 mg Oral Q6H PRN CHRISTINA Sheikh   amLODIPine 10 mg Oral Daily CHRISTINA Sheikh   aspirin 81 mg Oral Daily CHRISTINA Sheikh   atorvastatin 40 mg Oral Daily With Deonmoshe Aleida, CHRISTINA   buPROPion 300 mg Oral Daily CHRISTINA Sheikh   clopidogrel 75 mg Oral Daily CHRISTINA Florentino   enoxaparin 40 mg Subcutaneous Daily CHRISTINA Florentino   hydrALAZINE 10 mg Intravenous Q6H PRN CHRISTINA Sheikh   hydrochlorothiazide 25 mg Oral Daily CHRISTINA Florentino   insulin lispro 2-12 Units Subcutaneous TID AC CHRISTINA Florentino   losartan 25 mg Oral Daily CHRISTINA Florentino   ondansetron 4 mg Intravenous Q6H PRN CHRISTINA Sheikh   potassium-sodium phosphates 2 packet Oral BID With Meals CHRISTINA Sheikh   sertraline 50 mg Oral Daily CHRISTINA Florentino   sodium chloride 1 spray Each Nare TID PRN CHRISTINA Sheikh        Today, Patient Was Seen By: Kaylan Dodd MD    ** Please Note: Dictation voice to text software may have been used in the creation of this document   **

## 2020-02-10 VITALS
SYSTOLIC BLOOD PRESSURE: 153 MMHG | BODY MASS INDEX: 44.36 KG/M2 | DIASTOLIC BLOOD PRESSURE: 91 MMHG | HEART RATE: 80 BPM | RESPIRATION RATE: 20 BRPM | OXYGEN SATURATION: 93 % | WEIGHT: 292.7 LBS | HEIGHT: 68 IN | TEMPERATURE: 97.7 F

## 2020-02-10 PROBLEM — N18.9 CKD (CHRONIC KIDNEY DISEASE): Status: ACTIVE | Noted: 2020-02-10

## 2020-02-10 PROBLEM — R93.0 RIGHT MAXILLARY SINUS OPACIFICATION: Status: ACTIVE | Noted: 2020-02-10

## 2020-02-10 PROBLEM — G47.33 OSA (OBSTRUCTIVE SLEEP APNEA): Status: ACTIVE | Noted: 2020-02-10

## 2020-02-10 LAB
ANION GAP SERPL CALCULATED.3IONS-SCNC: 7 MMOL/L (ref 4–13)
ANION GAP SERPL CALCULATED.3IONS-SCNC: 8 MMOL/L (ref 4–13)
ANION GAP SERPL CALCULATED.3IONS-SCNC: 8 MMOL/L (ref 4–13)
BASOPHILS # BLD AUTO: 0.1 THOUSANDS/ΜL (ref 0–0.1)
BASOPHILS NFR BLD AUTO: 1 % (ref 0–1)
BUN SERPL-MCNC: 23 MG/DL (ref 5–25)
BUN SERPL-MCNC: 23 MG/DL (ref 5–25)
BUN SERPL-MCNC: 24 MG/DL (ref 5–25)
CALCIUM SERPL-MCNC: 8.6 MG/DL (ref 8.3–10.1)
CALCIUM SERPL-MCNC: 9 MG/DL (ref 8.3–10.1)
CALCIUM SERPL-MCNC: 9.1 MG/DL (ref 8.3–10.1)
CHLORIDE SERPL-SCNC: 100 MMOL/L (ref 100–108)
CHLORIDE SERPL-SCNC: 102 MMOL/L (ref 100–108)
CHLORIDE SERPL-SCNC: 99 MMOL/L (ref 100–108)
CO2 SERPL-SCNC: 24 MMOL/L (ref 21–32)
CO2 SERPL-SCNC: 25 MMOL/L (ref 21–32)
CO2 SERPL-SCNC: 27 MMOL/L (ref 21–32)
CREAT SERPL-MCNC: 1.21 MG/DL (ref 0.6–1.3)
CREAT SERPL-MCNC: 1.25 MG/DL (ref 0.6–1.3)
CREAT SERPL-MCNC: 1.4 MG/DL (ref 0.6–1.3)
EOSINOPHIL # BLD AUTO: 0.21 THOUSAND/ΜL (ref 0–0.61)
EOSINOPHIL NFR BLD AUTO: 2 % (ref 0–6)
ERYTHROCYTE [DISTWIDTH] IN BLOOD BY AUTOMATED COUNT: 13.5 % (ref 11.6–15.1)
GFR SERPL CREATININE-BSD FRML MDRD: 55 ML/MIN/1.73SQ M
GFR SERPL CREATININE-BSD FRML MDRD: 64 ML/MIN/1.73SQ M
GFR SERPL CREATININE-BSD FRML MDRD: 66 ML/MIN/1.73SQ M
GLUCOSE SERPL-MCNC: 180 MG/DL (ref 65–140)
GLUCOSE SERPL-MCNC: 208 MG/DL (ref 65–140)
GLUCOSE SERPL-MCNC: 234 MG/DL (ref 65–140)
GLUCOSE SERPL-MCNC: 243 MG/DL (ref 65–140)
GLUCOSE SERPL-MCNC: 264 MG/DL (ref 65–140)
HCT VFR BLD AUTO: 52.7 % (ref 36.5–49.3)
HGB BLD-MCNC: 17.6 G/DL (ref 12–17)
IMM GRANULOCYTES # BLD AUTO: 0.1 THOUSAND/UL (ref 0–0.2)
IMM GRANULOCYTES NFR BLD AUTO: 1 % (ref 0–2)
LYMPHOCYTES # BLD AUTO: 3.03 THOUSANDS/ΜL (ref 0.6–4.47)
LYMPHOCYTES NFR BLD AUTO: 22 % (ref 14–44)
MCH RBC QN AUTO: 30.3 PG (ref 26.8–34.3)
MCHC RBC AUTO-ENTMCNC: 33.4 G/DL (ref 31.4–37.4)
MCV RBC AUTO: 91 FL (ref 82–98)
MONOCYTES # BLD AUTO: 1.15 THOUSAND/ΜL (ref 0.17–1.22)
MONOCYTES NFR BLD AUTO: 8 % (ref 4–12)
NEUTROPHILS # BLD AUTO: 9.15 THOUSANDS/ΜL (ref 1.85–7.62)
NEUTS SEG NFR BLD AUTO: 66 % (ref 43–75)
NRBC BLD AUTO-RTO: 0 /100 WBCS
PLATELET # BLD AUTO: 263 THOUSANDS/UL (ref 149–390)
PMV BLD AUTO: 9.7 FL (ref 8.9–12.7)
POTASSIUM SERPL-SCNC: 3.5 MMOL/L (ref 3.5–5.3)
POTASSIUM SERPL-SCNC: 3.9 MMOL/L (ref 3.5–5.3)
POTASSIUM SERPL-SCNC: 3.9 MMOL/L (ref 3.5–5.3)
RBC # BLD AUTO: 5.81 MILLION/UL (ref 3.88–5.62)
SODIUM SERPL-SCNC: 132 MMOL/L (ref 136–145)
SODIUM SERPL-SCNC: 133 MMOL/L (ref 136–145)
SODIUM SERPL-SCNC: 135 MMOL/L (ref 136–145)
WBC # BLD AUTO: 13.74 THOUSAND/UL (ref 4.31–10.16)

## 2020-02-10 PROCEDURE — 80048 BASIC METABOLIC PNL TOTAL CA: CPT | Performed by: INTERNAL MEDICINE

## 2020-02-10 PROCEDURE — 85025 COMPLETE CBC W/AUTO DIFF WBC: CPT | Performed by: INTERNAL MEDICINE

## 2020-02-10 PROCEDURE — 82948 REAGENT STRIP/BLOOD GLUCOSE: CPT

## 2020-02-10 PROCEDURE — 97530 THERAPEUTIC ACTIVITIES: CPT

## 2020-02-10 PROCEDURE — 99239 HOSP IP/OBS DSCHRG MGMT >30: CPT | Performed by: NURSE PRACTITIONER

## 2020-02-10 PROCEDURE — 80048 BASIC METABOLIC PNL TOTAL CA: CPT | Performed by: NURSE PRACTITIONER

## 2020-02-10 RX ORDER — CLOPIDOGREL BISULFATE 75 MG/1
75 TABLET ORAL DAILY
Qty: 30 TABLET | Refills: 0 | Status: ON HOLD | OUTPATIENT
Start: 2020-02-11 | End: 2021-11-04 | Stop reason: SDDI

## 2020-02-10 RX ORDER — ATORVASTATIN CALCIUM 40 MG/1
40 TABLET, FILM COATED ORAL
Qty: 30 TABLET | Refills: 0 | Status: ON HOLD | OUTPATIENT
Start: 2020-02-10 | End: 2021-11-04 | Stop reason: SDDI

## 2020-02-10 RX ORDER — ECHINACEA PURPUREA EXTRACT 125 MG
2 TABLET ORAL 2 TIMES DAILY
Qty: 15 ML | Refills: 0 | Status: ON HOLD | OUTPATIENT
Start: 2020-02-10 | End: 2021-11-04 | Stop reason: SDDI

## 2020-02-10 RX ORDER — ASPIRIN 81 MG/1
81 TABLET, CHEWABLE ORAL DAILY
Qty: 30 TABLET | Refills: 0 | Status: ON HOLD | OUTPATIENT
Start: 2020-02-11 | End: 2021-11-04 | Stop reason: SDDI

## 2020-02-10 RX ADMIN — SERTRALINE HYDROCHLORIDE 50 MG: 50 TABLET ORAL at 09:05

## 2020-02-10 RX ADMIN — INSULIN LISPRO 2 UNITS: 100 INJECTION, SOLUTION INTRAVENOUS; SUBCUTANEOUS at 07:38

## 2020-02-10 RX ADMIN — CLOPIDOGREL BISULFATE 75 MG: 75 TABLET ORAL at 09:05

## 2020-02-10 RX ADMIN — SODIUM CHLORIDE 500 ML: 0.9 INJECTION, SOLUTION INTRAVENOUS at 11:58

## 2020-02-10 RX ADMIN — ENOXAPARIN SODIUM 40 MG: 40 INJECTION SUBCUTANEOUS at 09:05

## 2020-02-10 RX ADMIN — ASPIRIN 81 MG 81 MG: 81 TABLET ORAL at 09:05

## 2020-02-10 RX ADMIN — AMLODIPINE BESYLATE 10 MG: 5 TABLET ORAL at 09:05

## 2020-02-10 RX ADMIN — HYDROCHLOROTHIAZIDE 25 MG: 25 TABLET ORAL at 09:05

## 2020-02-10 RX ADMIN — LOSARTAN POTASSIUM 25 MG: 25 TABLET, FILM COATED ORAL at 09:05

## 2020-02-10 RX ADMIN — INSULIN LISPRO 4 UNITS: 100 INJECTION, SOLUTION INTRAVENOUS; SUBCUTANEOUS at 11:36

## 2020-02-10 RX ADMIN — POTASSIUM & SODIUM PHOSPHATES POWDER PACK 280-160-250 MG 2 PACKET: 280-160-250 PACK at 07:47

## 2020-02-10 RX ADMIN — BUPROPION HYDROCHLORIDE 300 MG: 300 TABLET, FILM COATED, EXTENDED RELEASE ORAL at 09:06

## 2020-02-10 NOTE — PLAN OF CARE
Problem: PHYSICAL THERAPY ADULT  Goal: Performs mobility at highest level of function for planned discharge setting  See evaluation for individualized goals  Description  Treatment/Interventions: ADL retraining, Functional transfer training, LE strengthening/ROM, Elevations, Therapeutic exercise, Endurance training, OT, Spoke to nursing, Patient/family training, Equipment eval/education, Gait training  Equipment Recommended: Burak Bustamante       See flowsheet documentation for full assessment, interventions and recommendations  Outcome: Progressing  Note:   Prognosis: Good  Problem List: Impaired balance, Decreased coordination  Assessment: Pt reports vision is clear but continues to report dizziness only improved by laying down or severely reclining  Able to take few steps with rw without LOB using RW and 1 assist  Unconfortable attempting to move outside center of balance even in sitting  Meclazine suggested by Neuro but no order for same at this time  REcommend in-pt rehab at d/c  Con't PT Encouraged extended upright position to increase tolerance  Barriers to Discharge: (medical clearance)     Recommendation: (in-pt rehab)     PT - OK to Discharge: Yes    See flowsheet documentation for full assessment

## 2020-02-10 NOTE — DISCHARGE INSTRUCTIONS
Chronic Hypertension   WHAT YOU NEED TO KNOW:   Hypertension is high blood pressure (BP)  Your BP is the force of your blood moving against the walls of your arteries  Normal BP is less than 120/80  Prehypertension is between 120/80 and 139/89  Hypertension is 140/90 or higher  Hypertension causes your BP to get so high that your heart has to work much harder than normal  This can damage your heart  Chronic hypertension is a long-term condition that you can control with a healthy lifestyle or medicines  A controlled blood pressure helps protect your organs, such as your heart, lungs, brain, and kidneys  DISCHARGE INSTRUCTIONS:   Call 911 for any of the following:   · You have discomfort in your chest that feels like squeezing, pressure, fullness, or pain  · You become confused or have difficulty speaking  · You suddenly feel lightheaded or have trouble breathing  · You have pain or discomfort in your back, neck, jaw, stomach, or arm  Seek care immediately if:   · You have a severe headache or vision loss  · You have weakness in an arm or leg  Contact your healthcare provider if:   · You feel faint, dizzy, confused, or drowsy  · You have been taking your BP medicine and your BP is still higher than your healthcare provider says it should be  · You have questions or concerns about your condition or care  Medicines: You may need any of the following:  · Medicine  may be used to help lower your BP  You may need more than one type of medicine  Take the medicine exactly as directed  · Diuretics  help decrease extra fluid that collects in your body  This will help lower your BP  You may urinate more often while you take this medicine  · Cholesterol medicine  helps lower your cholesterol level  A low cholesterol level helps prevent heart disease and makes it easier to control your blood pressure  · Take your medicine as directed    Contact your healthcare provider if you think your medicine is not helping or if you have side effects  Tell him or her if you are allergic to any medicine  Keep a list of the medicines, vitamins, and herbs you take  Include the amounts, and when and why you take them  Bring the list or the pill bottles to follow-up visits  Carry your medicine list with you in case of an emergency  Follow up with your healthcare provider as directed: You will need to return to have your blood pressure checked and to have other lab tests done  Write down your questions so you remember to ask them during your visits  Manage chronic hypertension:  Talk with your healthcare provider about these and other ways to manage hypertension:  · Take your BP at home  Sit and rest for 5 minutes before you take your BP  Extend your arm and support it on a flat surface  Your arm should be at the same level as your heart  Follow the directions that came with your BP monitor  If possible, take at least 2 BP readings each time  Take your BP at least twice a day at the same times each day, such as morning and evening  Keep a record of your BP readings and bring it to your follow-up visits  Ask your healthcare provider what your blood pressure should be  · Limit sodium (salt) as directed  Too much sodium can affect your fluid balance  Check labels to find low-sodium or no-salt-added foods  Some low-sodium foods use potassium salts for flavor  Too much potassium can also cause health problems  Your healthcare provider will tell you how much sodium and potassium are safe for you to have in a day  He or she may recommend that you limit sodium to 2,300 mg a day  · Follow the meal plan recommended by your healthcare provider  A dietitian or your provider can give you more information on low-sodium plans or the DASH (Dietary Approaches to Stop Hypertension) eating plan  The DASH plan is low in sodium, unhealthy fats, and total fat  It is high in potassium, calcium, and fiber  · Exercise to maintain a healthy weight  Exercise at least 30 minutes per day, on most days of the week  This will help decrease your blood pressure  Ask about the best exercise plan for you  · Decrease stress  This may help lower your BP  Learn ways to relax, such as deep breathing or listening to music  · Limit alcohol  Women should limit alcohol to 1 drink a day  Men should limit alcohol to 2 drinks a day  A drink of alcohol is 12 ounces of beer, 5 ounces of wine, or 1½ ounces of liquor  · Do not smoke  Nicotine and other chemicals in cigarettes and cigars can increase your BP and also cause lung damage  Ask your healthcare provider for information if you currently smoke and need help to quit  E-cigarettes or smokeless tobacco still contain nicotine  Talk to your healthcare provider before you use these products  © 2017 [employer]  Information is for End User's use only and may not be sold, redistributed or otherwise used for commercial purposes  All illustrations and images included in CareNotes® are the copyrighted property of [employer]  or [employer]  The above information is an  only  It is not intended as medical advice for individual conditions or treatments  Talk to your doctor, nurse or pharmacist before following any medical regimen to see if it is safe and effective for you  Type 2 Diabetes in Adults   WHAT YOU NEED TO KNOW:   Type 2 diabetes is a disease that affects how your body uses glucose (sugar)  Normally, when the blood sugar level increases, the pancreas makes more insulin  Insulin helps move sugar out of the blood so it can be used for energy  Type 2 diabetes develops because either the body cannot make enough insulin, or it cannot use the insulin correctly  After many years, your pancreas may stop making insulin     DISCHARGE INSTRUCTIONS:   Call 911 for any of the following:   · You have any of the following signs of a stroke:      ¨ Numbness or drooping on one side of your face     ¨ Weakness in an arm or leg    ¨ Confusion or difficulty speaking    ¨ Dizziness, a severe headache, or vision loss    · You have any of the following signs of a heart attack:      ¨ Squeezing, pressure, or pain in your chest that lasts longer than 5 minutes or returns    ¨ Discomfort or pain in your back, neck, jaw, stomach, or arm     ¨ Trouble breathing    ¨ Nausea or vomiting    ¨ Lightheadedness or a sudden cold sweat, especially with chest pain or trouble breathing  Seek care immediately if:   · You have severe abdominal pain, or the pain spreads to your back  You may also be vomiting  · You have trouble staying awake or focusing  · You are shaking or sweating  · You have blurred or double vision  · Your breath has a fruity, sweet smell  · Your breathing is deep and labored, or rapid and shallow  · Your heartbeat is fast and weak  Contact your healthcare provider if:   · You are vomiting or have diarrhea  · You have an upset stomach and cannot eat the foods on your meal plan  · You feel weak or more tired than usual      · You feel dizzy, have headaches, or are easily irritated  · Your skin is red, warm, dry, or swollen  · You have a wound that does not heal      · You have numbness in your arms or legs  · You have trouble coping with your illness, or you feel anxious or depressed  · You have questions or concerns about your condition or care  Medicines: You may  need any of the following:  · Hypoglycemic medicines or insulin  may be given to decrease the amount of sugar in your blood  · Blood pressure medicine  may be given to lower your blood pressure  Your blood pressure should be less than 140/90  · Cholesterol lowering medicine  may be given to prevent heart disease  · Antiplatelets , such as aspirin, help prevent blood clots  Take your antiplatelet medicine exactly as directed  These medicines make it more likely for you to bleed or bruise  If you are told to take aspirin, do not take acetaminophen or ibuprofen instead  · Take your medicine as directed  Contact your healthcare provider if you think your medicine is not helping or if you have side effects  Tell him or her if you are allergic to any medicine  Keep a list of the medicines, vitamins, and herbs you take  Include the amounts, and when and why you take them  Bring the list or the pill bottles to follow-up visits  Carry your medicine list with you in case of an emergency  Check your blood sugar level as directed: You will be taught how to use a glucose monitor  Ask your healthcare provider when and how often to check during the day  You will need to check your blood sugar level at least 3 times each day if you are on insulin  If you check your blood sugar level before a meal , it should be between 80 and 130 mg/dL  If you check your blood sugar level 1 to 2 hours after a meal , it should be less than 180 mg/dL  Ask your healthcare provider if these are good goals for you  Write down your results, and show them to your healthcare provider  He may use the results to make changes to your medicine, food, or exercise schedules  If your blood sugar level is too low: Your blood sugar level is too low if it goes below 70 mg/dL  If the level is too low, eat or drink 15 grams of fast-acting carbohydrate  These are found naturally in fruits  Fast-acting carbohydrates will raise your blood sugar level quickly  Examples of 15 grams of fast-acting carbohydrate are 4 ounces (½ cup) of fruit juice or 4 ounces of regular soda  Other examples are 2 tablespoons of raisins or 3 to 4 glucose tablets  Check your blood sugar level 15 minutes later  If the level is still low (less than 100 mg/dL), eat another 15 grams of carbohydrate  When the level returns to 100 mg/dL, eat a snack or meal that contains carbohydrates   This will help prevent another drop in blood sugar  Always carefully follow your healthcare provider's instructions on how to treat low blood sugar levels  Wear medical alert identification:  Wear medical alert jewelry or carry a card that says you have diabetes  Ask your healthcare provider where to get these items  Check your feet each day for sores:  Wear shoes and socks that fit correctly  Do not trim your toenails  Ask your healthcare provider for more information about foot care  Maintain a healthy weight:  Ask your healthcare provider how much you should weigh  A healthy weight can help you control your diabetes  Ask your provider to help you create a weight loss plan if you are overweight  Together you can set manageable weight loss goals  Follow your meal plan:  A dietitian will help you make a meal plan to keep your blood sugar level steady  Do not skip meals  Your blood sugar level may drop too low if you have taken diabetes medicine and do not eat  · Keep track of carbohydrates (sugar and starchy foods)  Your blood sugar level can get too high if you eat too many carbohydrates  Your dietitian will help you plan meals and snacks that have the right amount of carbohydrates  · Eat low-fat foods , such as skinless chicken and low-fat milk  · Eat less sodium (salt)  Limit high-sodium foods, such as soy sauce, potato chips, and soup  Do not add salt to food you cook  Limit your use of table salt  You should have less than 2,300 mg of sodium per day  · Eat high-fiber foods , such as vegetables, whole grain breads, and beans  · Limit alcohol  Alcohol affects your blood sugar level and can make it harder to manage your diabetes  Limit alcohol to 1 drink a day if you are a woman  Limit alcohol to 2 drinks a day if you are a man  A drink of alcohol is 12 ounces of beer, 5 ounces of wine, or 1½ ounces of liquor    Exercise as directed:  Exercise can help keep your blood sugar level steady, decrease your risk of heart disease, and help you lose weight  Stretch before and after you exercise  Exercise for at least 150 minutes every week  Spread this amount of exercise over at least 3 days a week  Do not skip exercise more than 2 days in a row  Include muscle strengthening activities 2 to 3 days each week  Older adults should include balance training 2 to 3 times each week  Activities that help increase balance include yoga and nancy chi  Work with your healthcare provider to create an exercise plan  · Check your blood sugar level before and after exercise  Healthcare providers may tell you to change the amount of insulin you take or food you eat  If your blood sugar level is high, check your blood or urine for ketones before you exercise  Do not exercise if your blood sugar level is high and you have ketones  · If your blood sugar level is less than 100 mg/dL, have a carbohydrate snack before you exercise  Examples are 4 to 6 crackers, ½ banana, 8 ounces (1 cup) of milk, or 4 ounces (½ cup) of juice  Drink water or liquids that do not contain sugar before, during, and after exercise  Ask your dietitian or healthcare provider which liquids you should drink when you exercise  · Do not sit for longer than 30 minutes  If you cannot walk around, at least stand up  This will help you stay active and keep your blood circulating  Do not smoke:  Nicotine and other chemicals in cigarettes and cigars can cause lung damage and make it more difficult to manage your diabetes  Ask your healthcare provider for information if you currently smoke and need help to quit  Do not use e-cigarettes or smokeless tobacco in place of cigarettes or to help you quit  They still contain nicotine  Check your blood pressure as directed:  Ask your healthcare provider what your blood pressure should be  Most adults with diabetes and high blood pressure should have a systolic blood pressure (first number) less than 140   Your diastolic blood pressure (second number) should be less than 90  Ask about vaccines: You have a higher risk for serious illness if you get the flu, pneumonia, or hepatitis  Ask your healthcare provider if you should get a flu, pneumonia, or hepatitis B vaccine, and when to get the vaccine  Follow up with your healthcare provider as directed: You may need to return to have your A1c checked every 3 months  You will need to return at least once each year to have your feet checked  You will need an eye exam once a year to check for retinopathy  You will also need urine tests every year to check for kidney problems  You may need tests to monitor for heart disease such as an EKG, stress test, blood pressure monitoring, and blood tests  Write down your questions so you remember to ask them during your visits  © 2017 [facility] Information is for End User's use only and may not be sold, redistributed or otherwise used for commercial purposes  All illustrations and images included in CareNotes® are the copyrighted property of [employer]  or [employer]  The above information is an  only  It is not intended as medical advice for individual conditions or treatments  Talk to your doctor, nurse or pharmacist before following any medical regimen to see if it is safe and effective for you  Stroke   WHAT YOU NEED TO KNOW:   A stroke happens when blood flow to part of the brain is interrupted  This can cause serious brain damage from a lack of oxygen  Brain function may be affected depending on where the stroke happens  A stroke caused by a blood clot is called an ischemic stroke  Ischemic stroke is the most common type  A stroke caused by a burst or torn blood vessel is called a hemorrhagic stroke  When stroke symptoms go away completely within minutes to hours and do not cause damage, it is called a transient ischemic attack (TIA)   A TIA is a warning sign that you are at risk of soon having a stroke  DISCHARGE INSTRUCTIONS:   Call 911 for any of the following:   · You have any of the following signs of a stroke:      ¨ Numbness or drooping on one side of your face     ¨ Weakness in an arm or leg    ¨ Confusion or difficulty speaking    ¨ Dizziness, a severe headache, or vision loss    ·            · You have a seizure  · You feel lightheaded, short of breath, and have chest pain  Seek care immediately if:   · Your blood sugar level or blood pressure is higher or lower than usual     · You have trouble swallowing  · You fall  Contact your healthcare provider if:   · You have trouble having a bowel movement or urinating  · You have questions or concerns about your condition or care  Medicines:  Medicines will depend on the kind of stroke you had  You may need any of the following:  · Medicines  may be given to treat high cholesterol, high blood pressure, or diabetes  · Antiplatelets , such as aspirin, help prevent blood clots  Take your antiplatelet medicine exactly as directed  These medicines make it more likely for you to bleed or bruise  If you are told to take aspirin, do not take acetaminophen or ibuprofen instead  · Blood thinners    help prevent blood clots  Examples of blood thinners include heparin and warfarin  Clots can cause strokes, heart attacks, and death  The following are general safety guidelines to follow while you are taking a blood thinner:    ¨ Watch for bleeding and bruising while you take blood thinners  Watch for bleeding from your gums or nose  Watch for blood in your urine and bowel movements  Use a soft washcloth on your skin, and a soft toothbrush to brush your teeth  This can keep your skin and gums from bleeding  If you shave, use an electric shaver  Do not play contact sports  ¨ Tell your dentist and other healthcare providers that you take anticoagulants  Wear a bracelet or necklace that says you take this medicine       ¨ Do not start or stop any medicines unless your healthcare provider tells you to  Many medicines cannot be used with blood thinners  ¨ Tell your healthcare provider right away if you forget to take the medicine, or if you take too much  ¨ Warfarin  is a blood thinner that you may need to take  The following are things you should be aware of if you take warfarin  § Foods and medicines can affect the amount of warfarin in your blood  Do not make major changes to your diet while you take warfarin  Warfarin works best when you eat about the same amount of vitamin K every day  Vitamin K is found in green leafy vegetables and certain other foods  Ask for more information about what to eat when you are taking warfarin  § You will need to see your healthcare provider for follow-up visits when you are on warfarin  You will need regular blood tests  These tests are used to decide how much medicine you need  · Take your medicine as directed  Contact your healthcare provider if you think your medicine is not helping or if you have side effects  Tell him or her if you are allergic to any medicine  Keep a list of the medicines, vitamins, and herbs you take  Include the amounts, and when and why you take them  Bring the list or the pill bottles to follow-up visits  Carry your medicine list with you in case of an emergency  Know the warning signs of a stroke: The word F A S T  can help you remember and recognize warning signs of a stroke  · F = Face:  One side of the face droops  · A = Arms:  One arm starts to drop when both arms are raised  · S = Speech:  Speech is slurred or sounds different than usual     · T = Time:  A person who is having a stroke needs to be seen immediately  A stroke is a medical emergency that needs immediate treatment  Most medicines and treatments work best the sooner they are given  ·        Follow up with your healthcare provider as directed:   You may need to come in for regular tests of your brain function  If you are taking warfarin, you will need to come in for regular blood tests  Your INR levels will also need to be checked  These tests help make sure you are taking the right amount of warfarin  Write down your questions so you remember to ask them during your visits  Go to rehabilitation (rehab) as directed:  Rehab is an important part of treatment  A speech therapist can help you relearn or improve your ability to talk and swallow  You may start slowly and start doing more difficult tasks over time  Physical therapists can help you gain strength and build endurance  Occupational therapists can teach you new ways to do daily activities, such as getting dressed  Therapy can help you improve your ability to walk or keep your balance  Your therapy may include tasks or movements you will need to do for everyday activities  An example is being able to raise or lower yourself from a chair  Care for yourself after a stroke:   · Make your home safe  Remove anything you might trip over  Tape electrical cords down  Keep paths clear throughout your home  Make sure your home is well lighted  Put nonslip materials on surfaces that might be slippery  An example is your bathtub or shower floor  · Use assistive devices  A cane or walker may help you keep your balance as you walk  Prevent another stroke:   · Manage health conditions  Conditions such as atrial fibrillation and diabetes can increase your risk for a stroke  Control your glucose carefully if you have hyperglycemia or diabetes  · Check your blood pressure as directed  High blood pressure can increase your risk for a stroke  If you have high blood pressure, follow your healthcare provider's directions for controlling your blood pressure  · Do not smoke  Nicotine and other chemicals in cigarettes and cigars can increase your risk for another stroke and cause lung damage   Ask your healthcare provider for information if you currently smoke and need help to quit  E-cigarettes or smokeless tobacco still contain nicotine  Talk to your healthcare provider before you use these products  · Do not drink alcohol  Alcohol can increase your risk for a stroke  Alcohol may also increase your blood pressure or thin your blood  Blood thinning can increase your risk for hemorrhagic stroke  · Eat a variety of healthy foods  Healthy foods include whole-grain breads, low-fat dairy products, beans, lean meats, and fish  Eat at least 5 servings of fruits and vegetables each day  Choose foods that are low in fat, cholesterol, salt, and sugar  Eat foods that are high in potassium, such as potatoes and bananas  · Exercise as directed  Activity is important for preventing another stroke  You may need to work with an exercise therapist to learn how to exercise safely  Exercise may help you be able to do your normal activities more easily  Exercise also helps control your blood pressure and weight  · Maintain a healthy weight  Ask your healthcare provider how much you should weigh  Ask him or her to help you create a weight loss plan if you are overweight  Ask about the best exercise plan for you  · Manage stress  Stress can increase your blood pressure  Find new ways to relax, such as deep breathing or listening to music  What you need to know about depression:  Depression can happen after a stroke  Talk to your healthcare provider if you have depression that continues or is getting worse  Your provider may be able to help treat your depression  Your provider can also recommend support groups for you to join  A support group is a place to talk with others who have had a stroke  It may also help to talk to friends and family members about how you are feeling  Tell your family and friends that if they see these signs, to let your healthcare provider know   You may show any of the following signs of depression:  · Extreme sadness    · Avoiding social interaction with family or friends    · A lack of interest in things you once enjoyed    · Irritability    · Trouble sleeping    · Low energy levels    · A change in eating habits or sudden weight gain or loss  For support and more information:   · National St[employer] Association  [address] , [place] [address]  Phone: 6- 839 - 211-1915  Web Address: [employer]   org  © 2017 [address] [employer] Information is for End User's use only and may not be sold, redistributed or otherwise used for commercial purposes  All illustrations and images included in CareNotes® are the copyrighted property of [employer]  or [employer]  The above information is an  only  It is not intended as medical advice for individual conditions or treatments  Talk to your doctor, nurse or pharmacist before following any medical regimen to see if it is safe and effective for you

## 2020-02-10 NOTE — INCIDENTAL FINDINGS
The following findings require follow up:  Radiographic finding   Finding: Extensive maxillary sinus, right anterior ethmoid, and right frontal sinus opacification compatible obstruction at the level of the middle meatus for which ENT consultation is recommended on a nonemergent basis  Follow up required: ENT (information provided on d/c instructions)   Follow up should be done within 2 weeks to a month(s)    Please notify the following clinician to assist with the follow up:    Your PCP

## 2020-02-10 NOTE — PROGRESS NOTES
Pleasant man  Quiet  Did not have any complaints or concerns  No display of despondency  I oriented him to pastoral care       02/10/20 [address] Affiliation None   Spiritual Beliefs/Perceptions   Support Systems Spouse/significant other;Family members   Stress Factors   Patient Stress Factors None identified   Coping Responses   Patient Coping Accepting   Plan of Care   Assessment Completed by: Unit visit Thickening of the neck arteries is worsening, plz keep repeart cholesterol level in 2 wks, plz let me uutyty

## 2020-02-10 NOTE — OCCUPATIONAL THERAPY NOTE
Occupational Therapy Treatment Note     Patient Name: Rufus ENGLAND Date: 2/10/2020  Problem List  Principal Problem:    Stroke [facility])  Active Problems:    Dizziness    Hypertension    Diabetes mellitus ([address] )    Morbid obesity with body mass index of 40 0-49 9 (HCC)    Depression    Leukocytosis    IV infiltrate          02/10/20 0848   Restrictions/Precautions   Weight Bearing Precautions Per Order No   Other Precautions Fall Risk   Pain Assessment   Pain Assessment No/denies pain   Pain Score No Pain   ADL   Grooming Assistance 7  Independent   Grooming Deficit Setup   Bed Mobility   Sit to Supine 5  Supervision   Additional items Verbal cues; Bedrails   Additional Comments Pt received seated in recliner   Transfers   Sit to Stand 4  Minimal assistance   Additional items Assist x 1; Impulsive  (RW)   Stand to Sit 4  Minimal assistance   Additional items Assist x 1;Verbal cues  (RW)   Stand pivot 4  Minimal assistance   Additional items Assist x 1;Verbal cues  (RW)   Coordination   Gross Motor Performed functional task requiring pt to reach for moving target with LUE  Mild ataxia noted  Improved compared to previous OT encounter  Vision   Vision Comments Pt endorses decreased blurry vision  C/o persistent dizziness  Assessment   Assessment Patient participated in Skilled OT session this date with interventions consisting of safety awareness and fall prevention techniques,  therapeutic activities to: increase activity tolerance, increase dynamic sit/ stand balance during functional activity , increase postural control, increase trunk control and increase OOB/ sitting tolerance   Patient agreeable to OT treatment session, upon arrival patient was found seated OOB to Recliner  Treatment session as follows: sit>stand with min A x 1 with RW, stand pivot to bed with min A x 1 with RW, grooming activity seated at EOB with set-up, and suit>supine with supervision   Pt continues to report dizziness -worsened when sitting upright/standing  Pt does however reports that blurry/double vision has improved  LUE ataxia has also improved  Patient continues to be functioning below baseline level, occupational performance remains limited secondary to factors listed above, and pt at increased risk for falls and injury  From OT standpoint, recommendation at time of d/c would be Short Term Rehab  Patient to benefit from continued Occupational Therapy treatment while in the hospital to address deficits as defined above and maximize level of functional independence with ADLs and functional mobility  Pt left with call bell in reach, tray table in reach, needs met  RN aware  Plan   Treatment Interventions ADL retraining;Functional transfer training;Patient/family training;Equipment evaluation/education; Compensatory technique education;Continued evaluation   Goal Expiration Date 02/17/20   OT Treatment Day 1   OT Frequency 3-5x/wk   Recommendation   OT Discharge Recommendation Short Term Rehab         VINCE Glasgow/L

## 2020-02-10 NOTE — TRANSPORTATION MEDICAL NECESSITY
Section I - General Information    Name of Patient: Claire Oneal                 : 1960    Medicare #: [id number]  Transport Date: 02/10/20 (PCS is valid for round trips on this date and for all repetitive trips in the 60-day range as noted below )  Origin: [address] [place] [address]                                                         Destination: [facility]  Is the pt's stay covered under Medicare Part A (PPS/DRG)   []     Closest appropriate facility? If no, why is transport to more distant facility required? Yes  If hospice pt, is this transport related to pt's terminal illness? NA       Section II - Medical Necessity Questionnaire  Ambulance transportation is medically necessary only if other means of transport are contraindicated or would be potentially harmful to the patient  To meet this requirement, the patient must either be "bed confined" or suffer from a condition such that transport by means other than ambulance is contraindicated by the patient's condition  The following questions must be answered by the medical professional signing below for this form to be valid:    1)  Describe the MEDICAL CONDITION (physical and/or mental) of this patient AT [address] [place] [address] that requires the patient to be transported in an ambulance and why transport by other means is contraindicated by the patient's condition: CVA, fall risk, needs min assist x1 for transfers and ambulation, dizziness, not able to sit in chair safely due to dizzness    2) Is the patient "bed confined" as defined below? No  To be "be confined" the patient must satisfy all three of the following conditions: (1) unable to get up from bed without Assistance; AND (2) unable to ambulate; AND (3) unable to sit in a chair or wheelchair  3) Can this patient safely be transported by car or wheelchair van (i e , seated during transport without a medical attendant or monitoring)?   No 4) In addition to completing questions 1-3 above, please check any of the following conditions that apply*:   *Note: supporting documentation for any boxes checked must be maintained in the patient's medical records  If hosp-hosp transfer, describe services needed at 2nd facility not available at 1st facility? Medical attendant required   Unable to tolerate seated position for time needed to transport       Section III - Signature of Physician or Healthcare Professional  I certify that the above information is true and correct based on my evaluation of this patient, and represent that the patient requires transport by ambulance and that other forms of transport are contraindicated  I understand that this information will be used by the Centers for Medicare and Medicaid Services (CMS) to support the determination of medical necessity for ambulance services, and I represent that I have personal knowledge of the patient's condition at time of transport  []  If this box is checked, I also certify that the patient is physically or mentally incapable of signing the ambulance service's claim and that the institution with which I am affiliated has furnished care, services, or assistance to the patient  My signature below is made on behalf of the patient pursuant to 42 CFR §424 36(b)(4)  In accordance with 42 CFR §424 37, the specific reason(s) that the patient is physically or mentally incapable of signing the claim form is as follows:       Signature of Physician* or Healthcare Professional______________________________________________________________  Signature Date 02/10/20 (For scheduled repetitive transports, this form is not valid for transports performed more than 60 days after this date)    Printed Name & Credentials of Physician or Healthcare Professional (MD, DO, RN, etc )________________________________  *Form must be signed by patient's attending physician for scheduled, repetitive transports   For non-repetitive, unscheduled ambulance transports, if unable to obtain the signature of the attending physician, any of the following may sign (choose appropriate option below)  [] Physician Assistant []  Clinical Nurse Specialist []  Registered Nurse  []  Nurse Practitioner  [x] Discharge Planner

## 2020-02-10 NOTE — NURSING NOTE
Pt s/p IV contrast extravasation, Omnipaque 100 ml  left antecubital site 2/7/2020  Pt denies discomfort to site, site is without redness, minimal swelling  Pt reports full sensation to left extremity  Left radial pulse +2  Inpatient nursing has been evaluating site as per inpt flowsheet  Pt knows to report any problems or concerns to nursing staff

## 2020-02-10 NOTE — PROGRESS NOTES
Pastoral Care Progress Note    2/10/2020  Patient: Leonard Peters : 1960  Admission Date & Time: 2020 1830  MRN: [id number] [facility]: [id number]                     Chaplaincy Interventions Utilized:   Empowerment: Provided chaplaincy education    Exploration: Explored emotional needs & resources, Explored relational needs & resources and Explored spiritual needs & resources        Relationship Building: Cultivated a relationship of care and support and Listened empathically    Chaplaincy Outcomes Achieved:  Declined  support    Spiritual Coping Strategies Utilized:   No spiritual coping       02/10/20 [address] Affiliation None   Spiritual Beliefs/Perceptions   Support Systems Spouse/significant other;Family members   Stress Factors   Patient Stress Factors None identified   Coping Responses   Patient Coping Accepting   Plan of Care   Assessment Completed by: Unit visit

## 2020-02-10 NOTE — PLAN OF CARE
Problem: OCCUPATIONAL THERAPY ADULT  Goal: Performs self-care activities at highest level of function for planned discharge setting  See evaluation for individualized goals  Description  Treatment Interventions: ADL retraining, Functional transfer training, Neuromuscular reeducation, Compensatory technique education, Continued evaluation, Patient/family training, Equipment evaluation/education, UE strengthening/ROM          See flowsheet documentation for full assessment, interventions and recommendations  Outcome: Progressing  Note:   Limitation: Decreased ADL status, Decreased self-care trans, Decreased high-level ADLs(impaired coordination)  Prognosis: Good  Assessment: Patient participated in Skilled OT session this date with interventions consisting of safety awareness and fall prevention techniques,  therapeutic activities to: increase activity tolerance, increase dynamic sit/ stand balance during functional activity , increase postural control, increase trunk control and increase OOB/ sitting tolerance   Patient agreeable to OT treatment session, upon arrival patient was found seated OOB to Recliner  Treatment session as follows: sit>stand with min A x 1 with RW, stand pivot to bed with min A x 1 with RW, grooming activity seated at EOB with set-up, and suit>supine with supervision  Pt continues to report dizziness -worsened when sitting upright/standing  Pt does however reports that blurry/double vision has improved  LUE ataxia has also improved  Patient continues to be functioning below baseline level, occupational performance remains limited secondary to factors listed above, and pt at increased risk for falls and injury  From OT standpoint, recommendation at time of d/c would be Short Term Rehab     Patient to benefit from continued Occupational Therapy treatment while in the hospital to address deficits as defined above and maximize level of functional independence with ADLs and functional mobility  Pt left with call bell in reach, tray table in reach, needs met  RN aware        OT Discharge Recommendation: Short Term Rehab

## 2020-02-10 NOTE — ASSESSMENT & PLAN NOTE
· Likely related to DM & HTN  · GFR on arrival to hospital was 63, ranges from 55-75, Cr range 1 09-1 40  · F/u with PCP & Nephrology  · Patient received 500cc bolus with Cr improving to 1 21 post bolus

## 2020-02-10 NOTE — PHYSICAL THERAPY NOTE
PT tx     02/10/20 0850   Pain Assessment   Pain Assessment No/denies pain   Pain Score No Pain   Pain Type   (just constant dizziness)   General   Chart Reviewed Yes   Additional Pertinent History changed to SDU for frequent neurochecks   Cognition   Orientation Level Oriented X4   Subjective   Subjective had breakfast oob yesterday x 2 hours but mostly reclined   Transfers   Sit to Stand 4  Minimal assistance   Additional items Assist x 1   Stand to Sit 4  Minimal assistance   Additional items Assist x 1   Stand pivot 4  Minimal assistance   Additional items Assist x 1;Verbal cues;Armrests; Impulsive  (rw)   Ambulation/Elevation   Gait pattern Inconsistent irwin; Short stride; Step to;Shuffling   Gait Assistance 4  Minimal assist   Additional items Assist x 1; Tactile cues; Verbal cues   Assistive Device Rolling walker   Distance 5'   Balance   Static Sitting Fair +   Dynamic Sitting Poor +   Static Standing Fair   Dynamic Standing Fair -   Ambulatory Fair -   Endurance Deficit   Endurance Deficit Yes   Endurance Deficit Description c/o increased dizziness adn need sto lay down  then puts hob up   Activity Tolerance   Activity Tolerance   (limited by dizziness)   Medical Staff Made Aware OT Emeli   Nurse Made Aware BEBA Garrison   Assessment   Prognosis Good   Problem List Impaired balance;Decreased coordination   Assessment Pt reports vision is clear but continues to report dizziness only improved by laying down or severely reclining  Able to take few steps with rw without LOB using RW and 1 assist  Unconfortable attempting to move outside center of balance even in sitting  Meclazine suggested by Neuro but no order for same at this time  REcommend in-pt rehab at d/c  Con't PT Encouraged extended upright position to increase tolerance     Barriers to Discharge   (medical clearance)   Goals   Patient Goals get better reduce dizziness   Plan   Treatment/Interventions ADL retraining;Functional transfer training; Endurance training;Patient/family training;Equipment eval/education; Bed mobility;Gait training;Spoke to nursing;Spoke to case management;OT  (balance training)   Progress Slow progress, medical status limitations   PT Frequency 5x/wk   Recommendation   Recommendation   (in-pt rehab)   Equipment Recommended Martine Prudent   PT - OK to Discharge Yes   Additional Comments   (to in-pt rehab with medical clearance)   Yesenia Fonseca, PT

## 2020-02-10 NOTE — ASSESSMENT & PLAN NOTE
· As per CT imaging - Extensive maxillary sinus, right anterior ethmoid, and right frontal sinus opacification compatible obstruction at the level of the middle meatus for which ENT consultation is recommended on a nonemergent basis    · Started on nasal saline spray BID   · F/u with ENT as outpatient

## 2020-02-10 NOTE — ASSESSMENT & PLAN NOTE
Lab Results   Component Value Date    HGBA1C 8 7 (H) 02/07/2020       Recent Labs     02/09/20  1142 02/09/20  1528 02/09/20  2110 02/10/20  0716   POCGLU 218* 129 138 180*       Blood Sugar Average: Last 72 hrs:  (P) 162 25   · Continue SSI at rehab  · Increase home metformin to 500 mg BID   · F/u with PCP and Endocrinology   · Hgba1c recheck in 3 months  · Diabetic diet   · QID glucose checks

## 2020-02-10 NOTE — PLAN OF CARE
Problem: Neurological Deficit  Goal: Neurological status is stable or improving  Description  Interventions:  - Monitor and assess patient's level of consciousness, motor function, sensory function, and level of assistance needed for ADLs  - Monitor and report changes from baseline  Collaborate with interdisciplinary team to initiate plan and implement interventions as ordered  - Provide and maintain a safe environment  - Consider seizure precautions  - Consider fall precautions  - Consider aspiration precautions  - Consider bleeding precautions  Outcome: Progressing     Problem: Activity Intolerance/Impaired Mobility  Goal: Mobility/activity is maintained at optimum level for patient  Description  Interventions:  - Assess and monitor patient  barriers to mobility and need for assistive/adaptive devices  - Assess patient's emotional response to limitations  - Collaborate with interdisciplinary team and initiate plans and interventions as ordered  - Encourage independent activity per ability   - Maintain proper body alignment  - Perform active/passive rom as tolerated/ordered  - Plan activities to conserve energy   - Turn patient as appropriate  Outcome: Progressing     Problem: Communication Impairment  Goal: Ability to express needs and understand communication  Description  Assess patient's communication skills and ability to understand information  Patient will demonstrate use of effective communication techniques, alternative methods of communication and understanding even if not able to speak  - Encourage communication and provide alternate methods of communication as needed  - Collaborate with case management/ for discharge needs  - Include patient/family/caregiver in decisions related to communication    Outcome: Progressing     Problem: Potential for Aspiration  Goal: Non-ventilated patient's risk of aspiration is minimized  Description  Assess and monitor vital signs, respiratory status, and labs (WBC)  Monitor for signs of aspiration (tachypnea, cough, rales, wheezing, cyanosis, fever)  - Assess and monitor patient's ability to swallow  - Place patient up in chair to eat if possible  - HOB up at 90 degrees to eat if unable to get patient up into chair   - Supervise patient during oral intake  - Instruct patient/ family to take small bites  - Instruct patient/ family to take small single sips when taking liquids  - Follow patient-specific strategies generated by speech pathologist   Outcome: Progressing  Goal: Ventilated patient's risk of aspiration is minimized  Description  Assess and monitor vital signs, respiratory status, airway cuff pressure, and labs (WBC)  Monitor for signs of aspiration (tachypnea, cough, rales, wheezing, cyanosis, fever)  - Elevate head of bed 30 degrees if patient has tube feeding   - Monitor tube feeding  Outcome: Progressing     Problem: Nutrition  Goal: Nutrition/Hydration status is improving  Description  Monitor and assess patient's nutrition/hydration status for malnutrition (ex- brittle hair, bruises, dry skin, pale skin and conjunctiva, muscle wasting, smooth red tongue, and disorientation)  Collaborate with interdisciplinary team and initiate plan and interventions as ordered  Monitor patient's weight and dietary intake as ordered or per policy  Utilize nutrition screening tool and intervene per policy  Determine patient's food preferences and provide high-protein, high-caloric foods as appropriate  - Assist patient with eating   - Allow adequate time for meals   - Encourage patient to take dietary supplement as ordered  - Collaborate with clinical nutritionist   - Include patient/family/caregiver in decisions related to nutrition    Outcome: Progressing     Problem: PAIN - ADULT  Goal: Verbalizes/displays adequate comfort level or baseline comfort level  Description  Interventions:  - Encourage patient to monitor pain and request assistance  - Assess pain using appropriate pain scale  - Administer analgesics based on type and severity of pain and evaluate response  - Implement non-pharmacological measures as appropriate and evaluate response  - Consider cultural and social influences on pain and pain management  - Notify physician/advanced practitioner if interventions unsuccessful or patient reports new pain  Outcome: Progressing     Problem: INFECTION - ADULT  Goal: Absence or prevention of progression during hospitalization  Description  INTERVENTIONS:  - Assess and monitor for signs and symptoms of infection  - Monitor lab/diagnostic results  - Monitor all insertion sites, i e  indwelling lines, tubes, and drains  - Monitor endotracheal if appropriate and nasal secretions for changes in amount and color  - [place] appropriate cooling/warming therapies per order  - Administer medications as ordered  - Instruct and encourage patient and family to use good hand hygiene technique  - Identify and instruct in appropriate isolation precautions for identified infection/condition  Outcome: Progressing  Goal: Absence of fever/infection during neutropenic period  Description  INTERVENTIONS:  - Monitor WBC    Outcome: Progressing     Problem: SAFETY ADULT  Goal: Patient will remain free of falls  Description  INTERVENTIONS:  - Assess patient frequently for physical needs  -  Identify cognitive and physical deficits and behaviors that affect risk of falls    -  [place] fall precautions as indicated by assessment   - Educate patient/family on patient safety including physical limitations  - Instruct patient to call for assistance with activity based on assessment  - Modify environment to reduce risk of injury  - Consider OT/PT consult to assist with strengthening/mobility  Outcome: Progressing  Goal: Maintain or return to baseline ADL function  Description  INTERVENTIONS:  -  Assess patient's ability to carry out ADLs; assess patient's baseline for ADL function and identify physical deficits which impact ability to perform ADLs (bathing, care of mouth/teeth, toileting, grooming, dressing, etc )  - Assess/evaluate cause of self-care deficits   - Assess range of motion  - Assess patient's mobility; develop plan if impaired  - Assess patient's need for assistive devices and provide as appropriate  - Encourage maximum independence but intervene and supervise when necessary  - Involve family in performance of ADLs  - Assess for home care needs following discharge   - Consider OT consult to assist with ADL evaluation and planning for discharge  - Provide patient education as appropriate  Outcome: Progressing  Goal: Maintain or return mobility status to optimal level  Description  INTERVENTIONS:  - Assess patient's baseline mobility status (ambulation, transfers, stairs, etc )    - Identify cognitive and physical deficits and behaviors that affect mobility  - Identify mobility aids required to assist with transfers and/or ambulation (gait belt, sit-to-stand, lift, walker, cane, etc )  - [place] fall precautions as indicated by assessment  - Record patient progress and toleration of activity level on Mobility SBAR; progress patient to next Phase/Stage  - Instruct patient to call for assistance with activity based on assessment  - Consider rehabilitation consult to assist with strengthening/weightbearing, etc   Outcome: Progressing     Problem: DISCHARGE PLANNING  Goal: Discharge to home or other facility with appropriate resources  Description  INTERVENTIONS:  - Identify barriers to discharge w/patient and caregiver  - Arrange for needed discharge resources and transportation as appropriate  - Identify discharge learning needs (meds, wound care, etc )  - Arrange for interpretive services to assist at discharge as needed  - Refer to Case Management Department for coordinating discharge planning if the patient needs post-hospital services based on physician/advanced practitioner order or complex needs related to functional status, cognitive ability, or social support system  Outcome: Progressing     Problem: Knowledge Deficit  Goal: Patient/family/caregiver demonstrates understanding of disease process, treatment plan, medications, and discharge instructions  Description  Complete learning assessment and assess knowledge base  Interventions:  - Provide teaching at level of understanding  - Provide teaching via preferred learning methods  Outcome: Progressing     Problem: CARDIOVASCULAR - ADULT  Goal: Maintains optimal cardiac output and hemodynamic stability  Description  INTERVENTIONS:  - Monitor I/O, vital signs and rhythm  - Monitor for S/S and trends of decreased cardiac output  - Administer and titrate ordered vasoactive medications to optimize hemodynamic stability  - Assess quality of pulses, skin color and temperature  - Assess for signs of decreased coronary artery perfusion  - Instruct patient to report change in severity of symptoms  Outcome: Progressing  Goal: Absence of cardiac dysrhythmias or at baseline rhythm  Description  INTERVENTIONS:  - Continuous cardiac monitoring, vital signs, obtain 12 lead EKG if ordered  - Administer antiarrhythmic and heart rate control medications as ordered  - Monitor electrolytes and administer replacement therapy as ordered  Outcome: Progressing     Problem: Potential for Falls  Goal: Patient will remain free of falls  Description  INTERVENTIONS:  - Assess patient frequently for physical needs  -  Identify cognitive and physical deficits and behaviors that affect risk of falls    -  [place] fall precautions as indicated by assessment   - Educate patient/family on patient safety including physical limitations  - Instruct patient to call for assistance with activity based on assessment  - Modify environment to reduce risk of injury  - Consider OT/PT consult to assist with strengthening/mobility  Outcome: Progressing

## 2020-02-10 NOTE — PLAN OF CARE
Problem: Neurological Deficit  Goal: Neurological status is stable or improving  Description  Interventions:  - Monitor and assess patient's level of consciousness, motor function, sensory function, and level of assistance needed for ADLs  - Monitor and report changes from baseline  Collaborate with interdisciplinary team to initiate plan and implement interventions as ordered  - Provide and maintain a safe environment  - Consider seizure precautions  - Consider fall precautions  - Consider aspiration precautions  - Consider bleeding precautions  Outcome: Progressing     Problem: Activity Intolerance/Impaired Mobility  Goal: Mobility/activity is maintained at optimum level for patient  Description  Interventions:  - Assess and monitor patient  barriers to mobility and need for assistive/adaptive devices  - Assess patient's emotional response to limitations  - Collaborate with interdisciplinary team and initiate plans and interventions as ordered  - Encourage independent activity per ability   - Maintain proper body alignment  - Perform active/passive rom as tolerated/ordered  - Plan activities to conserve energy   - Turn patient as appropriate  Outcome: Progressing     Problem: Communication Impairment  Goal: Ability to express needs and understand communication  Description  Assess patient's communication skills and ability to understand information  Patient will demonstrate use of effective communication techniques, alternative methods of communication and understanding even if not able to speak  - Encourage communication and provide alternate methods of communication as needed  - Collaborate with case management/ for discharge needs  - Include patient/family/caregiver in decisions related to communication    Outcome: Progressing     Problem: Potential for Aspiration  Goal: Non-ventilated patient's risk of aspiration is minimized  Description  Assess and monitor vital signs, respiratory status, and labs (WBC)  Monitor for signs of aspiration (tachypnea, cough, rales, wheezing, cyanosis, fever)  - Assess and monitor patient's ability to swallow  - Place patient up in chair to eat if possible  - HOB up at 90 degrees to eat if unable to get patient up into chair   - Supervise patient during oral intake  - Instruct patient/ family to take small bites  - Instruct patient/ family to take small single sips when taking liquids  - Follow patient-specific strategies generated by speech pathologist   Outcome: Progressing     Problem: Nutrition  Goal: Nutrition/Hydration status is improving  Description  Monitor and assess patient's nutrition/hydration status for malnutrition (ex- brittle hair, bruises, dry skin, pale skin and conjunctiva, muscle wasting, smooth red tongue, and disorientation)  Collaborate with interdisciplinary team and initiate plan and interventions as ordered  Monitor patient's weight and dietary intake as ordered or per policy  Utilize nutrition screening tool and intervene per policy  Determine patient's food preferences and provide high-protein, high-caloric foods as appropriate  - Assist patient with eating   - Allow adequate time for meals   - Encourage patient to take dietary supplement as ordered  - Collaborate with clinical nutritionist   - Include patient/family/caregiver in decisions related to nutrition    Outcome: Progressing     Problem: PAIN - ADULT  Goal: Verbalizes/displays adequate comfort level or baseline comfort level  Description  Interventions:  - Encourage patient to monitor pain and request assistance  - Assess pain using appropriate pain scale  - Administer analgesics based on type and severity of pain and evaluate response  - Implement non-pharmacological measures as appropriate and evaluate response  - Consider cultural and social influences on pain and pain management  - Notify physician/advanced practitioner if interventions unsuccessful or patient reports new pain  Outcome: Progressing     Problem: INFECTION - ADULT  Goal: Absence or prevention of progression during hospitalization  Description  INTERVENTIONS:  - Assess and monitor for signs and symptoms of infection  - Monitor lab/diagnostic results  - Monitor all insertion sites, i e  indwelling lines, tubes, and drains  - Monitor endotracheal if appropriate and nasal secretions for changes in amount and color  - [place] appropriate cooling/warming therapies per order  - Administer medications as ordered  - Instruct and encourage patient and family to use good hand hygiene technique  - Identify and instruct in appropriate isolation precautions for identified infection/condition  Outcome: Progressing     Problem: SAFETY ADULT  Goal: Patient will remain free of falls  Description  INTERVENTIONS:  - Assess patient frequently for physical needs  -  Identify cognitive and physical deficits and behaviors that affect risk of falls    -  [place] fall precautions as indicated by assessment   - Educate patient/family on patient safety including physical limitations  - Instruct patient to call for assistance with activity based on assessment  - Modify environment to reduce risk of injury  - Consider OT/PT consult to assist with strengthening/mobility  Outcome: Progressing  Goal: Maintain or return to baseline ADL function  Description  INTERVENTIONS:  -  Assess patient's ability to carry out ADLs; assess patient's baseline for ADL function and identify physical deficits which impact ability to perform ADLs (bathing, care of mouth/teeth, toileting, grooming, dressing, etc )  - Assess/evaluate cause of self-care deficits   - Assess range of motion  - Assess patient's mobility; develop plan if impaired  - Assess patient's need for assistive devices and provide as appropriate  - Encourage maximum independence but intervene and supervise when necessary  - Involve family in performance of ADLs  - Assess for home care needs following discharge   - Consider OT consult to assist with ADL evaluation and planning for discharge  - Provide patient education as appropriate  Outcome: Progressing  Goal: Maintain or return mobility status to optimal level  Description  INTERVENTIONS:  - Assess patient's baseline mobility status (ambulation, transfers, stairs, etc )    - Identify cognitive and physical deficits and behaviors that affect mobility  - Identify mobility aids required to assist with transfers and/or ambulation (gait belt, sit-to-stand, lift, walker, cane, etc )  - [place] fall precautions as indicated by assessment  - Record patient progress and toleration of activity level on Mobility SBAR; progress patient to next Phase/Stage  - Instruct patient to call for assistance with activity based on assessment  - Consider rehabilitation consult to assist with strengthening/weightbearing, etc   Outcome: Progressing     Problem: DISCHARGE PLANNING  Goal: Discharge to home or other facility with appropriate resources  Description  INTERVENTIONS:  - Identify barriers to discharge w/patient and caregiver  - Arrange for needed discharge resources and transportation as appropriate  - Identify discharge learning needs (meds, wound care, etc )  - Arrange for interpretive services to assist at discharge as needed  - Refer to Case Management Department for coordinating discharge planning if the patient needs post-hospital services based on physician/advanced practitioner order or complex needs related to functional status, cognitive ability, or social support system  Outcome: Progressing     Problem: Knowledge Deficit  Goal: Patient/family/caregiver demonstrates understanding of disease process, treatment plan, medications, and discharge instructions  Description  Complete learning assessment and assess knowledge base    Interventions:  - Provide teaching at level of understanding  - Provide teaching via preferred learning methods  Outcome: Progressing     Problem: CARDIOVASCULAR - ADULT  Goal: Maintains optimal cardiac output and hemodynamic stability  Description  INTERVENTIONS:  - Monitor I/O, vital signs and rhythm  - Monitor for S/S and trends of decreased cardiac output  - Administer and titrate ordered vasoactive medications to optimize hemodynamic stability  - Assess quality of pulses, skin color and temperature  - Assess for signs of decreased coronary artery perfusion  - Instruct patient to report change in severity of symptoms  Outcome: Progressing  Goal: Absence of cardiac dysrhythmias or at baseline rhythm  Description  INTERVENTIONS:  - Continuous cardiac monitoring, vital signs, obtain 12 lead EKG if ordered  - Administer antiarrhythmic and heart rate control medications as ordered  - Monitor electrolytes and administer replacement therapy as ordered  Outcome: Progressing     Problem: Potential for Falls  Goal: Patient will remain free of falls  Description  INTERVENTIONS:  - Assess patient frequently for physical needs  -  Identify cognitive and physical deficits and behaviors that affect risk of falls    -  [place] fall precautions as indicated by assessment   - Educate patient/family on patient safety including physical limitations  - Instruct patient to call for assistance with activity based on assessment  - Modify environment to reduce risk of injury  - Consider OT/PT consult to assist with strengthening/mobility  Outcome: Progressing     Problem: Prexisting or High Potential for Compromised Skin Integrity  Goal: Skin integrity is maintained or improved  Description  INTERVENTIONS:  - Identify patients at risk for skin breakdown  - Assess and monitor skin integrity  - Assess and monitor nutrition and hydration status  - Monitor labs   - Assess for incontinence   - Turn and reposition patient  - Assist with mobility/ambulation  - Relieve pressure over bony prominences  - Avoid friction and shearing  - Provide appropriate hygiene as needed including keeping skin clean and dry  - Evaluate need for skin moisturizer/barrier cream  - Collaborate with interdisciplinary team   - Patient/family teaching  - Consider wound care consult   Outcome: Progressing

## 2020-02-10 NOTE — ASSESSMENT & PLAN NOTE
· Patient is aware, however, states he does not have the equipment to use at night   · F/u was provided on d/c instructions with Dr Tatyana Shelley from sleep medicine

## 2020-02-10 NOTE — SOCIAL WORK
Continue to follow  Per Morales Becker, liaison at [address] [place] [address], facility can accept Pt today  [facility] liaison obtained insurance auth from Pt's insurance([id number]: [place] [employer]), case [id number], update 2/14 to [phone]  Call placed to [facility], spoke with Janet Scanlon, [address] [place] [address] ambulance arranged to GAYE POLAN[facility]  Pickup is 2:00pm  Pt's nurse informed of transport time  Pt informed of transport time and requested  to call his mother to inform of transport time to [address] [place] [address]  Call placed to Pt's mother(Meena: [phone]), left message informing of transport time to [facility]  Anticipate return call  Pt's nurse informed of transport time  [facility] liaison informed of transport time  Call placed to Pt's insurance([email or web address]), spoke with Edward Daley, ambulance auth obtained for Chris Baugh # is [id number]  Call placed to [facility], spoke with Kamaljit Cheng informed Beryl Harley of ambulance Dalila Samano

## 2020-02-10 NOTE — ASSESSMENT & PLAN NOTE
· MRI with recent ischemia within the inferior left brainstem and brachium pontis  No hemorrhage    Abnormal flow characteristics in the right vertebral artery  · CTA of the head and done - as per Neurology, stroke is small, able to decrease neuro checks    · Patient was out of the time window for tPA on admission  · Neurology following  · Stroke pathway  · As per Neurology continue on aspirin and Plavix for 3 weeks and then switched to aspirin alone and add Pletal   Continue on statin  · Echo with an EF of 53% grade 1 diastolic dysfunction  · Fasting lipid profile 146/123/43/78  · A1c 8 7  · PT/OT  · Continue Cozaar/hydrochlorothiazide/Norvasc  · No AFib noted on telemetry  · Will need outpatient sleep study likely obstructive sleep apnea

## 2020-02-10 NOTE — TRANSPORTATION MEDICAL NECESSITY
Section I - General Information    Name of Patient: Maritza Gomes                 : 1960    Medicare #: [id number]  Transport Date: 02/10/20 (PCS is valid for round trips on this date and for all repetitive trips in the 60-day range as noted below )  Origin: [address] [facility] [address]                                                         Destination: [place] [address]  Is the pt's stay covered under Medicare Part A (PPS/DRG)   []     Closest appropriate facility? If no, why is transport to more distant facility required? Yes  If hospice pt, is this transport related to pt's terminal illness? NA       Section II - Medical Necessity Questionnaire  Ambulance transportation is medically necessary only if other means of transport are contraindicated or would be potentially harmful to the patient  To meet this requirement, the patient must either be "bed confined" or suffer from a condition such that transport by means other than ambulance is contraindicated by the patient's condition  The following questions must be answered by the medical professional signing below for this form to be valid:    1)  Describe the MEDICAL CONDITION (physical and/or mental) of this patient AT [address] [place] [address] that requires the patient to be transported in an ambulance and why transport by other means is contraindicated by the patient's condition: CVA, min assist x1 for transfers and ambulation, constant dizziness, fall risk, impulsive    2) Is the patient "bed confined" as defined below? No  To be "be confined" the patient must satisfy all three of the following conditions: (1) unable to get up from bed without Assistance; AND (2) unable to ambulate; AND (3) unable to sit in a chair or wheelchair  3) Can this patient safely be transported by car or wheelchair van (i e , seated during transport without a medical attendant or monitoring)?   No    4) In addition to completing questions 1-3 above, please check any of the following conditions that apply*:   *Note: supporting documentation for any boxes checked must be maintained in the patient's medical records  If hosp-hosp transfer, describe services needed at 2nd facility not available at 1st facility? Medical attendant required   Unable to tolerate seated position for time needed to transport   Other(specify) fall risk and impulsive      Section III - Signature of Physician or Healthcare Professional  I certify that the above information is true and correct based on my evaluation of this patient, and represent that the patient requires transport by ambulance and that other forms of transport are contraindicated  I understand that this information will be used by the Centers for Medicare and Medicaid Services (CMS) to support the determination of medical necessity for ambulance services, and I represent that I have personal knowledge of the patient's condition at time of transport  []  If this box is checked, I also certify that the patient is physically or mentally incapable of signing the ambulance service's claim and that the institution with which I am affiliated has furnished care, services, or assistance to the patient  My signature below is made on behalf of the patient pursuant to 42 CFR §424 36(b)(4)   In accordance with 42 CFR §424 37, the specific reason(s) that the patient is physically or mentally incapable of signing the claim form is as follows:       Signature of Physician* or Healthcare Professional______________________________________________________________  Signature Date 02/10/20 (For scheduled repetitive transports, this form is not valid for transports performed more than 60 days after this date)    Printed Name & Credentials of Physician or Healthcare Professional (MD, DO, RN, etc )________________________________  *Form must be signed by patient's attending physician for scheduled, repetitive transports   For non-repetitive, unscheduled ambulance transports, if unable to obtain the signature of the attending physician, any of the following may sign (choose appropriate option below)  [] Physician Assistant []  Clinical Nurse Specialist []  Registered Nurse  []  Nurse Practitioner  [x] Discharge Planner

## 2020-02-10 NOTE — DISCHARGE SUMMARY
Discharge- Haile Peers 1960, 64 y o  male MRN: [id number]    Unit/Bed#: -01 Encounter: [id number]    Primary Care Provider: John Copeland DO   Date and time admitted to hospital: 2/6/2020  6:30 PM    * Stroke [facility])  Assessment & Plan  · MRI with recent ischemia within the inferior left brainstem and brachium pontis  No hemorrhage    Abnormal flow characteristics in the right vertebral artery  · CTA of the head and done - as per Neurology, stroke is small, able to decrease neuro checks    · Patient was out of the time window for tPA on admission  · Neurology following  · Stroke pathway  · As per Neurology continue on aspirin and Plavix for 3 weeks and then switched to aspirin alone and add Pletal   Continue on statin  · Echo with an EF of 35% grade 1 diastolic dysfunction  · Fasting lipid profile 146/123/43/78  · A1c 8 7  · PT/OT  · Continue Cozaar/hydrochlorothiazide/Norvasc  · No AFib noted on telemetry  · Will need outpatient sleep study likely obstructive sleep apnea    Hypertension  Assessment & Plan  · Continue on Cozaar/hydrochlorothiazide/Norvasc  · SBP range 130-150s           IV infiltrate  Assessment & Plan  · Patient with an infiltrated IV contrast in the left antecubital  · IV was discontinued in CT scan   · Did not administer hyaluronidase given iv d/c'd  · Continue to ice and elevate with neurovascular checks  · Radial pulse intact     Diabetes mellitus [facility])  Assessment & Plan  Lab Results   Component Value Date    HGBA1C 8 7 (H) 02/07/2020       Recent Labs     02/09/20  1142 02/09/20  1528 02/09/20  2110 02/10/20  0716   POCGLU 218* 129 138 180*       Blood Sugar Average: Last 72 hrs:  (P) 162 25   · Continue SSI at rehab  · Increase home metformin to 500 mg BID   · F/u with PCP and Endocrinology   · Hgba1c recheck in 3 months  · Diabetic diet   · QID glucose checks     Leukocytosis  Assessment & Plan  · Likely reactive  · Procal negative, U/a negative · Afebrile    Depression  Assessment & Plan  · Continue zoloft & Wellbutrin   · Denies any suicidal or homicidal ideations    Morbid obesity with body mass index of 40 0-49 9 ([facility])  Assessment & Plan  · Lifestyle and dietary modifications discussed, patient verbalized understanding     DELANO (obstructive sleep apnea)  Assessment & Plan  · Patient is aware, however, states he does not have the equipment to use at night   · F/u was provided on d/c instructions with Dr Val England from sleep medicine     Right maxillary sinus opacification  Assessment & Plan  · As per CT imaging - Extensive maxillary sinus, right anterior ethmoid, and right frontal sinus opacification compatible obstruction at the level of the middle meatus for which ENT consultation is recommended on a nonemergent basis  · Started on nasal saline spray BID   · F/u with ENT as outpatient     CKD (chronic kidney disease)  Assessment & Plan  · Likely related to DM & HTN  · GFR on arrival to hospital was 63, ranges from 55-75, Cr range 1 09-1 40  · F/u with PCP & Nephrology  · Patient received 500cc bolus with Cr improving to 1 21 post bolus     Dizziness  Assessment & Plan  · Likely related to stroke   · PT/OT following, appreciate recommendations       Physical Exam   Constitutional: He is oriented to person, place, and time  HENT:   Head: Normocephalic and atraumatic  Eyes: Pupils are equal, round, and reactive to light  Neck: Normal range of motion  No tracheal deviation present  Cardiovascular: Normal rate and regular rhythm  Pulmonary/Chest: Effort normal and breath sounds normal  No respiratory distress  Abdominal: Soft  Bowel sounds are normal  He exhibits distension  There is no tenderness  Musculoskeletal: Normal range of motion  Lymphadenopathy:     He has no cervical adenopathy  Neurological: He is alert and oriented to person, place, and time  He has normal strength  Gait abnormal    dizzy   Skin: Skin is warm and dry  Nails show no clubbing  Psychiatric: His speech is normal  Cognition and memory are normal            Resolved Problems  Date Reviewed: 2/10/2020    None          Admission Date:   Admission Orders (From admission, onward)     Ordered        02/06/20 2302  Inpatient Admission  Once                     Admitting Diagnosis: TIA (transient ischemic attack) [G45 9]  Dizziness [R42]    HPI: As per AKIKO Landaverde "Sana Claros is a 64 y  o  male with a history of hypertension, she also has a surgical history of recurrent umbilical hernia, he came in by ambulance with concern of dizziness and room spinning sensation and also problem with his left arm is arm felt really weak and different around noon today which then finally resolved, he had a recent ear infection on the left and some sinus congestion   Falguni E dizziness is like a spinning sensation and he has the associated nausea  "    Procedures Performed:   Orders Placed This Encounter   Procedures    ED ECG Documentation Only       Summary of Hospital Course: As per MRI of the brain patient was found to have, recent ischemia within the inferior left brainstem and brachium pontis   No hemorrhage or significant mass effect   Flow characteristics of the right vertebral artery abnormal   The patient was transferred to the ICU due to findings and ongoing symptoms such as dizziness, occasional blurry vision, and headache  He was at high risk for decompensation and required closer neurological monitoring  He had a CTA of the head and neck overnight which Neurology reviewed personally and noted that the stroke is a very small one, therefore the patient did not require extensive neurological monitoring  While he was in the ICU he had no changes in his mental status other than dizziness while he attempted to get up   He was started on plavix, asa & statin       During his CTA of the head/neck his IV infiltrated and he had extravasation of contrast into his ANGELO  The IV was pulled therefore he was treated with ice and elevation  He has intact pulses and is on neurovascular checks q shift        He was also noted to have DELANO but was not compliant with wearing a cpap at night due to "not having one"  He is to f/u with Dr Swapnil Mendenhall for a sleep study once he is d/c  On the day of possible d/c BMP appeared abnormal, repeat BMP showed increase in Cr, possibly related to dehydration and elevated glucose  After 500 cc NSS bolus, Cr is back to baseline, gfr 66  Resume metformin, increase to BID and continue SSI  Consults:  Neurology   PT/OT    Significant Findings, Care, Treatment and Services Provided:   2/6/20- CT HEAD - No acute intracranial abnormality  Findings most consistent with precocious microangiopathic change and chronic left caudate head lacunar infarctions  2/7/20- MRI Brain -  Recent ischemia within the inferior left brainstem and brachium pontis  No hemorrhage or significant mass effect  Flow characteristics of the right vertebral artery abnormal   CTA of the neck is suggested to evaluate for etiology of right vertebral   flow restrictive disease      2/7/20 - CTA Head/Neck - No significant carotid or vertebral artery stenosis  Atherosclerotic aspiration left carotid bifurcation without significant stenosis  No focal intracranial stenosis or aneurysm    Extensive maxillary sinus, right anterior ethmoid, and right frontal sinus opacification compatible obstruction at the level of the middle meatus for which ENT consultation is recommended on a nonemergent basis  Complications:   None     Condition at Discharge: stable         Discharge instructions/Information to patient and family:   See after visit summary for information provided to patient and family  Provisions for Follow-Up Care:  See after visit summary for information related to follow-up care and any pertinent home health orders        PCP: Ross Busby DO    Disposition: Short-term rehab at [facility] Readmission: No    Discharge Statement   I spent 30 minutes discharging the patient  This time was spent on the day of discharge  I had direct contact with the patient on the day of discharge  Additional documentation is required if more than 30 minutes were spent on discharge  Discharge Medications:  See after visit summary for reconciled discharge medications provided to patient and family

## 2020-02-14 NOTE — UTILIZATION REVIEW
Notification of Discharge  This is a Notification of Discharge from our facility [address]  Please be advised that this patient has been discharge from our facility  Below you will find the admission and discharge date and time including the patients disposition  PRESENTATION DATE: 2/6/2020  6:30 PM  OBS ADMISSION DATE:   IP ADMISSION DATE: 2/6/20 2259   DISCHARGE DATE: 2/10/2020  2:31 PM  DISPOSITION: Non [facility]   Admission Orders listed below:  Admission Orders (From admission, onward)     Ordered        02/06/20 2302  Inpatient Admission  Once                   Please contact the UR Department if additional information is required to close this patient's authorization/case  Esther Tolentino  [facility] Utilization Review Department  Main: [phone] x carefully listen to the prompts  All voicemails are confidential   [email or web address]  Send all requests for admission clinical reviews, approved or denied determinations and any other requests to dedicated fax number below belonging to the campus where the patient is receiving treatment   List of dedicated fax numbers:  [address] DENIALS (Administrative/Medical Necessity) [phone]   [address] (Maternity/NICU/Pediatrics) [phone]   [facility] [phone]   [facility] [phone]   [facility] [phone]   Anders Buerger [address] [phone]   [facility]  [phone]   [address] [facility] [address]  [address] [facility] [address] [facility] [phone]

## 2020-04-16 ENCOUNTER — TELEPHONE (OUTPATIENT)
Dept: NEUROLOGY | Facility: CLINIC | Age: 60
End: 2020-04-16

## 2021-11-04 ENCOUNTER — APPOINTMENT (EMERGENCY)
Dept: RADIOLOGY | Facility: HOSPITAL | Age: 61
DRG: 871 | End: 2021-11-04
Payer: COMMERCIAL

## 2021-11-04 ENCOUNTER — HOSPITAL ENCOUNTER (INPATIENT)
Facility: HOSPITAL | Age: 61
LOS: 3 days | Discharge: HOME | DRG: 871 | End: 2021-11-07
Attending: EMERGENCY MEDICINE | Admitting: INTERNAL MEDICINE
Payer: COMMERCIAL

## 2021-11-04 DIAGNOSIS — E11.65 TYPE 2 DIABETES MELLITUS WITH HYPERGLYCEMIA, WITHOUT LONG-TERM CURRENT USE OF INSULIN (HCC): ICD-10-CM

## 2021-11-04 DIAGNOSIS — Z86.73 HISTORY OF STROKE: ICD-10-CM

## 2021-11-04 DIAGNOSIS — G47.33 OSA (OBSTRUCTIVE SLEEP APNEA): ICD-10-CM

## 2021-11-04 DIAGNOSIS — U07.1 COVID-19: ICD-10-CM

## 2021-11-04 DIAGNOSIS — R17 TOTAL BILIRUBIN, ELEVATED: ICD-10-CM

## 2021-11-04 DIAGNOSIS — J96.91 RESPIRATORY FAILURE WITH HYPOXIA (HCC): Primary | ICD-10-CM

## 2021-11-04 PROBLEM — A41.89 SEPSIS DUE TO COVID-19 (HCC): Status: ACTIVE | Noted: 2021-11-04

## 2021-11-04 PROBLEM — J96.01 ACUTE RESPIRATORY FAILURE WITH HYPOXIA (HCC): Status: ACTIVE | Noted: 2021-11-04

## 2021-11-04 PROBLEM — R09.02 HYPOXIA: Status: ACTIVE | Noted: 2021-11-04

## 2021-11-04 PROBLEM — J12.82 PNEUMONIA DUE TO COVID-19 VIRUS: Status: ACTIVE | Noted: 2021-11-04

## 2021-11-04 PROBLEM — N18.2 STAGE 2 CHRONIC KIDNEY DISEASE: Status: ACTIVE | Noted: 2020-02-10

## 2021-11-04 LAB
ALBUMIN SERPL BCP-MCNC: 3.3 G/DL (ref 3.5–5)
ALP SERPL-CCNC: 47 U/L (ref 46–116)
ALT SERPL W P-5'-P-CCNC: 44 U/L (ref 12–78)
ANION GAP SERPL CALCULATED.3IONS-SCNC: 11 MMOL/L (ref 4–13)
APTT PPP: 35 SECONDS (ref 23–37)
AST SERPL W P-5'-P-CCNC: 30 U/L (ref 5–45)
BASOPHILS # BLD AUTO: 0.02 THOUSANDS/ΜL (ref 0–0.1)
BASOPHILS NFR BLD AUTO: 0 % (ref 0–1)
BILIRUB SERPL-MCNC: 1.3 MG/DL (ref 0.2–1)
BUN SERPL-MCNC: 19 MG/DL (ref 5–25)
CA-I BLD-SCNC: 1.1 MMOL/L (ref 1.12–1.32)
CALCIUM ALBUM COR SERPL-MCNC: 9.5 MG/DL (ref 8.3–10.1)
CALCIUM SERPL-MCNC: 8.9 MG/DL (ref 8.3–10.1)
CHLORIDE SERPL-SCNC: 99 MMOL/L (ref 100–108)
CK MB SERPL-MCNC: 1.1 % (ref 0–2.5)
CK MB SERPL-MCNC: 4.1 NG/ML (ref 0–5)
CK SERPL-CCNC: 385 U/L (ref 39–308)
CO2 SERPL-SCNC: 26 MMOL/L (ref 21–32)
CREAT SERPL-MCNC: 1.25 MG/DL (ref 0.6–1.3)
CRP SERPL QL: 165.8 MG/L
D DIMER PPP FEU-MCNC: 1.15 UG/ML FEU
EOSINOPHIL # BLD AUTO: 0.02 THOUSAND/ΜL (ref 0–0.61)
EOSINOPHIL NFR BLD AUTO: 0 % (ref 0–6)
ERYTHROCYTE [DISTWIDTH] IN BLOOD BY AUTOMATED COUNT: 13.1 % (ref 11.6–15.1)
GFR SERPL CREATININE-BSD FRML MDRD: 63 ML/MIN/1.73SQ M
GLUCOSE SERPL-MCNC: 151 MG/DL (ref 65–140)
GLUCOSE SERPL-MCNC: 154 MG/DL (ref 65–140)
HCT VFR BLD AUTO: 47.7 % (ref 36.5–49.3)
HGB BLD-MCNC: 15.7 G/DL (ref 12–17)
IMM GRANULOCYTES # BLD AUTO: 0.04 THOUSAND/UL (ref 0–0.2)
IMM GRANULOCYTES NFR BLD AUTO: 0 % (ref 0–2)
INR PPP: 1.16 (ref 0.84–1.19)
LACTATE SERPL-SCNC: 1 MMOL/L (ref 0.5–2)
LYMPHOCYTES # BLD AUTO: 0.78 THOUSANDS/ΜL (ref 0.6–4.47)
LYMPHOCYTES NFR BLD AUTO: 8 % (ref 14–44)
MAGNESIUM SERPL-MCNC: 1.8 MG/DL (ref 1.6–2.6)
MCH RBC QN AUTO: 29.9 PG (ref 26.8–34.3)
MCHC RBC AUTO-ENTMCNC: 32.9 G/DL (ref 31.4–37.4)
MCV RBC AUTO: 91 FL (ref 82–98)
MONOCYTES # BLD AUTO: 0.56 THOUSAND/ΜL (ref 0.17–1.22)
MONOCYTES NFR BLD AUTO: 6 % (ref 4–12)
NEUTROPHILS # BLD AUTO: 8.67 THOUSANDS/ΜL (ref 1.85–7.62)
NEUTS SEG NFR BLD AUTO: 86 % (ref 43–75)
NRBC BLD AUTO-RTO: 0 /100 WBCS
NT-PROBNP SERPL-MCNC: 368 PG/ML
PLATELET # BLD AUTO: 198 THOUSANDS/UL (ref 149–390)
PMV BLD AUTO: 9.6 FL (ref 8.9–12.7)
POTASSIUM SERPL-SCNC: 4 MMOL/L (ref 3.5–5.3)
PROCALCITONIN SERPL-MCNC: <0.05 NG/ML
PROT SERPL-MCNC: 7.9 G/DL (ref 6.4–8.2)
PROTHROMBIN TIME: 14.6 SECONDS (ref 11.6–14.5)
RBC # BLD AUTO: 5.25 MILLION/UL (ref 3.88–5.62)
SARS-COV-2 RNA RESP QL NAA+PROBE: POSITIVE
SODIUM SERPL-SCNC: 136 MMOL/L (ref 136–145)
TROPONIN I SERPL-MCNC: 0.04 NG/ML
WBC # BLD AUTO: 10.09 THOUSAND/UL (ref 4.31–10.16)

## 2021-11-04 PROCEDURE — 80053 COMPREHEN METABOLIC PANEL: CPT | Performed by: EMERGENCY MEDICINE

## 2021-11-04 PROCEDURE — 71045 X-RAY EXAM CHEST 1 VIEW: CPT

## 2021-11-04 PROCEDURE — 36415 COLL VENOUS BLD VENIPUNCTURE: CPT | Performed by: EMERGENCY MEDICINE

## 2021-11-04 PROCEDURE — 82553 CREATINE MB FRACTION: CPT | Performed by: EMERGENCY MEDICINE

## 2021-11-04 PROCEDURE — 93005 ELECTROCARDIOGRAM TRACING: CPT

## 2021-11-04 PROCEDURE — 85610 PROTHROMBIN TIME: CPT | Performed by: EMERGENCY MEDICINE

## 2021-11-04 PROCEDURE — 85730 THROMBOPLASTIN TIME PARTIAL: CPT | Performed by: EMERGENCY MEDICINE

## 2021-11-04 PROCEDURE — 82728 ASSAY OF FERRITIN: CPT | Performed by: EMERGENCY MEDICINE

## 2021-11-04 PROCEDURE — 84145 PROCALCITONIN (PCT): CPT | Performed by: EMERGENCY MEDICINE

## 2021-11-04 PROCEDURE — 99284 EMERGENCY DEPT VISIT MOD MDM: CPT | Performed by: EMERGENCY MEDICINE

## 2021-11-04 PROCEDURE — 86140 C-REACTIVE PROTEIN: CPT | Performed by: EMERGENCY MEDICINE

## 2021-11-04 PROCEDURE — 83735 ASSAY OF MAGNESIUM: CPT | Performed by: EMERGENCY MEDICINE

## 2021-11-04 PROCEDURE — 83036 HEMOGLOBIN GLYCOSYLATED A1C: CPT | Performed by: PHYSICIAN ASSISTANT

## 2021-11-04 PROCEDURE — U0005 INFEC AGEN DETEC AMPLI PROBE: HCPCS | Performed by: EMERGENCY MEDICINE

## 2021-11-04 PROCEDURE — 85025 COMPLETE CBC W/AUTO DIFF WBC: CPT | Performed by: EMERGENCY MEDICINE

## 2021-11-04 PROCEDURE — 85379 FIBRIN DEGRADATION QUANT: CPT | Performed by: PHYSICIAN ASSISTANT

## 2021-11-04 PROCEDURE — 99285 EMERGENCY DEPT VISIT HI MDM: CPT

## 2021-11-04 PROCEDURE — 82948 REAGENT STRIP/BLOOD GLUCOSE: CPT

## 2021-11-04 PROCEDURE — XW033E5 INTRODUCTION OF REMDESIVIR ANTI-INFECTIVE INTO PERIPHERAL VEIN, PERCUTANEOUS APPROACH, NEW TECHNOLOGY GROUP 5: ICD-10-PCS | Performed by: INTERNAL MEDICINE

## 2021-11-04 PROCEDURE — 83605 ASSAY OF LACTIC ACID: CPT | Performed by: EMERGENCY MEDICINE

## 2021-11-04 PROCEDURE — 99223 1ST HOSP IP/OBS HIGH 75: CPT | Performed by: PHYSICIAN ASSISTANT

## 2021-11-04 PROCEDURE — 83880 ASSAY OF NATRIURETIC PEPTIDE: CPT | Performed by: EMERGENCY MEDICINE

## 2021-11-04 PROCEDURE — U0003 INFECTIOUS AGENT DETECTION BY NUCLEIC ACID (DNA OR RNA); SEVERE ACUTE RESPIRATORY SYNDROME CORONAVIRUS 2 (SARS-COV-2) (CORONAVIRUS DISEASE [COVID-19]), AMPLIFIED PROBE TECHNIQUE, MAKING USE OF HIGH THROUGHPUT TECHNOLOGIES AS DESCRIBED BY CMS-2020-01-R: HCPCS | Performed by: EMERGENCY MEDICINE

## 2021-11-04 PROCEDURE — 87040 BLOOD CULTURE FOR BACTERIA: CPT | Performed by: EMERGENCY MEDICINE

## 2021-11-04 PROCEDURE — 84484 ASSAY OF TROPONIN QUANT: CPT | Performed by: EMERGENCY MEDICINE

## 2021-11-04 PROCEDURE — 82550 ASSAY OF CK (CPK): CPT | Performed by: EMERGENCY MEDICINE

## 2021-11-04 PROCEDURE — 82330 ASSAY OF CALCIUM: CPT | Performed by: EMERGENCY MEDICINE

## 2021-11-04 RX ORDER — ACETAMINOPHEN 325 MG/1
975 TABLET ORAL ONCE
Status: COMPLETED | OUTPATIENT
Start: 2021-11-04 | End: 2021-11-04

## 2021-11-04 RX ORDER — CALCIUM CARBONATE 200(500)MG
1000 TABLET,CHEWABLE ORAL DAILY PRN
Status: DISCONTINUED | OUTPATIENT
Start: 2021-11-04 | End: 2021-11-07 | Stop reason: HOSPADM

## 2021-11-04 RX ORDER — LANOLIN ALCOHOL/MO/W.PET/CERES
6 CREAM (GRAM) TOPICAL
Status: DISCONTINUED | OUTPATIENT
Start: 2021-11-04 | End: 2021-11-07 | Stop reason: HOSPADM

## 2021-11-04 RX ORDER — IBUPROFEN 400 MG/1
800 TABLET ORAL ONCE
Status: COMPLETED | OUTPATIENT
Start: 2021-11-04 | End: 2021-11-04

## 2021-11-04 RX ORDER — BENZONATATE 100 MG/1
100 CAPSULE ORAL 3 TIMES DAILY PRN
Status: DISCONTINUED | OUTPATIENT
Start: 2021-11-04 | End: 2021-11-07 | Stop reason: HOSPADM

## 2021-11-04 RX ORDER — HEPARIN SODIUM 5000 [USP'U]/ML
7500 INJECTION, SOLUTION INTRAVENOUS; SUBCUTANEOUS EVERY 8 HOURS SCHEDULED
Status: DISCONTINUED | OUTPATIENT
Start: 2021-11-04 | End: 2021-11-07 | Stop reason: HOSPADM

## 2021-11-04 RX ORDER — ASPIRIN 81 MG/1
81 TABLET ORAL DAILY
Status: DISCONTINUED | OUTPATIENT
Start: 2021-11-05 | End: 2021-11-07 | Stop reason: HOSPADM

## 2021-11-04 RX ORDER — SENNOSIDES 8.6 MG
1 TABLET ORAL
Status: DISCONTINUED | OUTPATIENT
Start: 2021-11-04 | End: 2021-11-07 | Stop reason: HOSPADM

## 2021-11-04 RX ORDER — DEXAMETHASONE SODIUM PHOSPHATE 4 MG/ML
6 INJECTION, SOLUTION INTRA-ARTICULAR; INTRALESIONAL; INTRAMUSCULAR; INTRAVENOUS; SOFT TISSUE EVERY 24 HOURS
Status: DISCONTINUED | OUTPATIENT
Start: 2021-11-04 | End: 2021-11-07 | Stop reason: HOSPADM

## 2021-11-04 RX ORDER — ATORVASTATIN CALCIUM 40 MG/1
40 TABLET, FILM COATED ORAL
Status: DISCONTINUED | OUTPATIENT
Start: 2021-11-05 | End: 2021-11-07 | Stop reason: HOSPADM

## 2021-11-04 RX ORDER — ACETAMINOPHEN 325 MG/1
650 TABLET ORAL EVERY 6 HOURS PRN
Status: DISCONTINUED | OUTPATIENT
Start: 2021-11-04 | End: 2021-11-07 | Stop reason: HOSPADM

## 2021-11-04 RX ADMIN — HEPARIN SODIUM 7500 UNITS: 5000 INJECTION INTRAVENOUS; SUBCUTANEOUS at 22:42

## 2021-11-04 RX ADMIN — ACETAMINOPHEN 975 MG: 325 TABLET, FILM COATED ORAL at 16:09

## 2021-11-04 RX ADMIN — INSULIN LISPRO 1 UNITS: 100 INJECTION, SOLUTION INTRAVENOUS; SUBCUTANEOUS at 23:34

## 2021-11-04 RX ADMIN — REMDESIVIR 200 MG: 100 INJECTION, POWDER, LYOPHILIZED, FOR SOLUTION INTRAVENOUS at 22:42

## 2021-11-04 RX ADMIN — DEXAMETHASONE SODIUM PHOSPHATE 6 MG: 4 INJECTION, SOLUTION INTRAMUSCULAR; INTRAVENOUS at 22:43

## 2021-11-04 RX ADMIN — IBUPROFEN 800 MG: 400 TABLET ORAL at 16:09

## 2021-11-05 PROBLEM — E11.65 TYPE 2 DIABETES MELLITUS WITH HYPERGLYCEMIA, WITHOUT LONG-TERM CURRENT USE OF INSULIN (HCC): Status: ACTIVE | Noted: 2020-02-07

## 2021-11-05 LAB
ABO GROUP BLD: NORMAL
ALBUMIN SERPL BCP-MCNC: 2.9 G/DL (ref 3.5–5)
ALP SERPL-CCNC: 48 U/L (ref 46–116)
ALT SERPL W P-5'-P-CCNC: 39 U/L (ref 12–78)
ANION GAP SERPL CALCULATED.3IONS-SCNC: 9 MMOL/L (ref 4–13)
AST SERPL W P-5'-P-CCNC: 27 U/L (ref 5–45)
ATRIAL RATE: 88 BPM
BASOPHILS # BLD MANUAL: 0 THOUSAND/UL (ref 0–0.1)
BASOPHILS NFR MAR MANUAL: 0 % (ref 0–1)
BILIRUB SERPL-MCNC: 1 MG/DL (ref 0.2–1)
BUN SERPL-MCNC: 21 MG/DL (ref 5–25)
CALCIUM ALBUM COR SERPL-MCNC: 9.8 MG/DL (ref 8.3–10.1)
CALCIUM SERPL-MCNC: 8.9 MG/DL (ref 8.3–10.1)
CHLORIDE SERPL-SCNC: 101 MMOL/L (ref 100–108)
CO2 SERPL-SCNC: 26 MMOL/L (ref 21–32)
CREAT SERPL-MCNC: 1.16 MG/DL (ref 0.6–1.3)
EOSINOPHIL # BLD MANUAL: 0 THOUSAND/UL (ref 0–0.4)
EOSINOPHIL NFR BLD MANUAL: 0 % (ref 0–6)
ERYTHROCYTE [DISTWIDTH] IN BLOOD BY AUTOMATED COUNT: 13.2 % (ref 11.6–15.1)
EST. AVERAGE GLUCOSE BLD GHB EST-MCNC: 131 MG/DL
FERRITIN SERPL-MCNC: 352 NG/ML (ref 8–388)
GFR SERPL CREATININE-BSD FRML MDRD: 69 ML/MIN/1.73SQ M
GLUCOSE SERPL-MCNC: 157 MG/DL (ref 65–140)
GLUCOSE SERPL-MCNC: 169 MG/DL (ref 65–140)
GLUCOSE SERPL-MCNC: 200 MG/DL (ref 65–140)
GLUCOSE SERPL-MCNC: 201 MG/DL (ref 65–140)
GLUCOSE SERPL-MCNC: 245 MG/DL (ref 65–140)
HBA1C MFR BLD: 6.2 %
HCT VFR BLD AUTO: 50.5 % (ref 36.5–49.3)
HGB BLD-MCNC: 15.8 G/DL (ref 12–17)
LG PLATELETS BLD QL SMEAR: PRESENT
LYMPHOCYTES # BLD AUTO: 0.88 THOUSAND/UL (ref 0.6–4.47)
LYMPHOCYTES # BLD AUTO: 7 % (ref 14–44)
MCH RBC QN AUTO: 29.2 PG (ref 26.8–34.3)
MCHC RBC AUTO-ENTMCNC: 31.3 G/DL (ref 31.4–37.4)
MCV RBC AUTO: 93 FL (ref 82–98)
MONOCYTES # BLD AUTO: 0.13 THOUSAND/UL (ref 0–1.22)
MONOCYTES NFR BLD: 1 % (ref 4–12)
NEUTROPHILS # BLD MANUAL: 11.54 THOUSAND/UL (ref 1.85–7.62)
NEUTS BAND NFR BLD MANUAL: 3 % (ref 0–8)
NEUTS SEG NFR BLD AUTO: 89 % (ref 43–75)
P AXIS: 51 DEGREES
PLATELET # BLD AUTO: 193 THOUSANDS/UL (ref 149–390)
PLATELET BLD QL SMEAR: ADEQUATE
PMV BLD AUTO: 10 FL (ref 8.9–12.7)
POTASSIUM SERPL-SCNC: 4.9 MMOL/L (ref 3.5–5.3)
PR INTERVAL: 166 MS
PROCALCITONIN SERPL-MCNC: 0.06 NG/ML
PROT SERPL-MCNC: 8 G/DL (ref 6.4–8.2)
QRS AXIS: -79 DEGREES
QRSD INTERVAL: 122 MS
QT INTERVAL: 400 MS
QTC INTERVAL: 484 MS
RBC # BLD AUTO: 5.41 MILLION/UL (ref 3.88–5.62)
RH BLD: NEGATIVE
SODIUM SERPL-SCNC: 136 MMOL/L (ref 136–145)
T WAVE AXIS: 75 DEGREES
VENTRICULAR RATE: 88 BPM
WBC # BLD AUTO: 12.54 THOUSAND/UL (ref 4.31–10.16)

## 2021-11-05 PROCEDURE — 86900 BLOOD TYPING SEROLOGIC ABO: CPT | Performed by: PHYSICIAN ASSISTANT

## 2021-11-05 PROCEDURE — 82948 REAGENT STRIP/BLOOD GLUCOSE: CPT

## 2021-11-05 PROCEDURE — 86901 BLOOD TYPING SEROLOGIC RH(D): CPT | Performed by: PHYSICIAN ASSISTANT

## 2021-11-05 PROCEDURE — 99233 SBSQ HOSP IP/OBS HIGH 50: CPT | Performed by: INTERNAL MEDICINE

## 2021-11-05 PROCEDURE — 80053 COMPREHEN METABOLIC PANEL: CPT | Performed by: PHYSICIAN ASSISTANT

## 2021-11-05 PROCEDURE — 84145 PROCALCITONIN (PCT): CPT | Performed by: EMERGENCY MEDICINE

## 2021-11-05 PROCEDURE — 85007 BL SMEAR W/DIFF WBC COUNT: CPT | Performed by: PHYSICIAN ASSISTANT

## 2021-11-05 PROCEDURE — 93010 ELECTROCARDIOGRAM REPORT: CPT | Performed by: INTERNAL MEDICINE

## 2021-11-05 PROCEDURE — 85027 COMPLETE CBC AUTOMATED: CPT | Performed by: PHYSICIAN ASSISTANT

## 2021-11-05 RX ORDER — INSULIN GLARGINE 100 [IU]/ML
10 INJECTION, SOLUTION SUBCUTANEOUS
Status: DISCONTINUED | OUTPATIENT
Start: 2021-11-05 | End: 2021-11-07 | Stop reason: HOSPADM

## 2021-11-05 RX ADMIN — HEPARIN SODIUM 7500 UNITS: 5000 INJECTION INTRAVENOUS; SUBCUTANEOUS at 13:18

## 2021-11-05 RX ADMIN — DEXAMETHASONE SODIUM PHOSPHATE 6 MG: 4 INJECTION, SOLUTION INTRAMUSCULAR; INTRAVENOUS at 22:01

## 2021-11-05 RX ADMIN — HEPARIN SODIUM 7500 UNITS: 5000 INJECTION INTRAVENOUS; SUBCUTANEOUS at 22:01

## 2021-11-05 RX ADMIN — HEPARIN SODIUM 7500 UNITS: 5000 INJECTION INTRAVENOUS; SUBCUTANEOUS at 05:21

## 2021-11-05 RX ADMIN — ASPIRIN 81 MG: 81 TABLET, COATED ORAL at 09:08

## 2021-11-05 RX ADMIN — ATORVASTATIN CALCIUM 40 MG: 40 TABLET, FILM COATED ORAL at 17:04

## 2021-11-05 RX ADMIN — Medication 6 MG: at 22:06

## 2021-11-05 RX ADMIN — INSULIN LISPRO 2 UNITS: 100 INJECTION, SOLUTION INTRAVENOUS; SUBCUTANEOUS at 17:05

## 2021-11-05 RX ADMIN — INSULIN LISPRO 4 UNITS: 100 INJECTION, SOLUTION INTRAVENOUS; SUBCUTANEOUS at 09:07

## 2021-11-05 RX ADMIN — INSULIN LISPRO 4 UNITS: 100 INJECTION, SOLUTION INTRAVENOUS; SUBCUTANEOUS at 11:39

## 2021-11-05 RX ADMIN — INSULIN GLARGINE 10 UNITS: 100 INJECTION, SOLUTION SUBCUTANEOUS at 22:05

## 2021-11-05 RX ADMIN — INSULIN LISPRO 1 UNITS: 100 INJECTION, SOLUTION INTRAVENOUS; SUBCUTANEOUS at 22:04

## 2021-11-05 RX ADMIN — REMDESIVIR 100 MG: 100 INJECTION, POWDER, LYOPHILIZED, FOR SOLUTION INTRAVENOUS at 22:09

## 2021-11-06 PROBLEM — J96.00 ACUTE RESPIRATORY FAILURE DUE TO COVID-19 (HCC): Status: ACTIVE | Noted: 2021-11-04

## 2021-11-06 PROBLEM — Z86.73 HISTORY OF STROKE: Status: ACTIVE | Noted: 2020-02-07

## 2021-11-06 PROBLEM — U07.1 ACUTE RESPIRATORY FAILURE DUE TO COVID-19 (HCC): Status: ACTIVE | Noted: 2021-11-04

## 2021-11-06 LAB
ALBUMIN SERPL BCP-MCNC: 2.9 G/DL (ref 3.5–5)
ALP SERPL-CCNC: 50 U/L (ref 46–116)
ALT SERPL W P-5'-P-CCNC: 39 U/L (ref 12–78)
ANION GAP SERPL CALCULATED.3IONS-SCNC: 11 MMOL/L (ref 4–13)
AST SERPL W P-5'-P-CCNC: 22 U/L (ref 5–45)
BASOPHILS # BLD MANUAL: 0 THOUSAND/UL (ref 0–0.1)
BASOPHILS NFR MAR MANUAL: 0 % (ref 0–1)
BILIRUB SERPL-MCNC: 0.7 MG/DL (ref 0.2–1)
BUN SERPL-MCNC: 20 MG/DL (ref 5–25)
CALCIUM ALBUM COR SERPL-MCNC: 9.8 MG/DL (ref 8.3–10.1)
CALCIUM SERPL-MCNC: 8.9 MG/DL (ref 8.3–10.1)
CHLORIDE SERPL-SCNC: 101 MMOL/L (ref 100–108)
CO2 SERPL-SCNC: 24 MMOL/L (ref 21–32)
CREAT SERPL-MCNC: 1.06 MG/DL (ref 0.6–1.3)
CRP SERPL QL: 168.2 MG/L
EOSINOPHIL # BLD MANUAL: 0 THOUSAND/UL (ref 0–0.4)
EOSINOPHIL NFR BLD MANUAL: 0 % (ref 0–6)
ERYTHROCYTE [DISTWIDTH] IN BLOOD BY AUTOMATED COUNT: 13.1 % (ref 11.6–15.1)
GFR SERPL CREATININE-BSD FRML MDRD: 76 ML/MIN/1.73SQ M
GLUCOSE SERPL-MCNC: 193 MG/DL (ref 65–140)
GLUCOSE SERPL-MCNC: 200 MG/DL (ref 65–140)
GLUCOSE SERPL-MCNC: 219 MG/DL (ref 65–140)
GLUCOSE SERPL-MCNC: 229 MG/DL (ref 65–140)
GLUCOSE SERPL-MCNC: 252 MG/DL (ref 65–140)
HCT VFR BLD AUTO: 47.3 % (ref 36.5–49.3)
HGB BLD-MCNC: 15.4 G/DL (ref 12–17)
LYMPHOCYTES # BLD AUTO: 1.41 THOUSAND/UL (ref 0.6–4.47)
LYMPHOCYTES # BLD AUTO: 11 % (ref 14–44)
MCH RBC QN AUTO: 29.7 PG (ref 26.8–34.3)
MCHC RBC AUTO-ENTMCNC: 32.6 G/DL (ref 31.4–37.4)
MCV RBC AUTO: 91 FL (ref 82–98)
MONOCYTES # BLD AUTO: 0.13 THOUSAND/UL (ref 0–1.22)
MONOCYTES NFR BLD: 1 % (ref 4–12)
NEUTROPHILS # BLD MANUAL: 11.29 THOUSAND/UL (ref 1.85–7.62)
NEUTS BAND NFR BLD MANUAL: 1 % (ref 0–8)
NEUTS SEG NFR BLD AUTO: 87 % (ref 43–75)
PLATELET # BLD AUTO: 230 THOUSANDS/UL (ref 149–390)
PLATELET BLD QL SMEAR: ADEQUATE
PMV BLD AUTO: 9.9 FL (ref 8.9–12.7)
POTASSIUM SERPL-SCNC: 4.5 MMOL/L (ref 3.5–5.3)
PROCALCITONIN SERPL-MCNC: 0.07 NG/ML
PROT SERPL-MCNC: 8 G/DL (ref 6.4–8.2)
RBC # BLD AUTO: 5.18 MILLION/UL (ref 3.88–5.62)
RBC MORPH BLD: NORMAL
SODIUM SERPL-SCNC: 136 MMOL/L (ref 136–145)
WBC # BLD AUTO: 12.83 THOUSAND/UL (ref 4.31–10.16)

## 2021-11-06 PROCEDURE — 80053 COMPREHEN METABOLIC PANEL: CPT | Performed by: PHYSICIAN ASSISTANT

## 2021-11-06 PROCEDURE — 84145 PROCALCITONIN (PCT): CPT | Performed by: PHYSICIAN ASSISTANT

## 2021-11-06 PROCEDURE — 85027 COMPLETE CBC AUTOMATED: CPT | Performed by: PHYSICIAN ASSISTANT

## 2021-11-06 PROCEDURE — 86140 C-REACTIVE PROTEIN: CPT | Performed by: PHYSICIAN ASSISTANT

## 2021-11-06 PROCEDURE — 85007 BL SMEAR W/DIFF WBC COUNT: CPT | Performed by: PHYSICIAN ASSISTANT

## 2021-11-06 PROCEDURE — 82948 REAGENT STRIP/BLOOD GLUCOSE: CPT

## 2021-11-06 PROCEDURE — 99232 SBSQ HOSP IP/OBS MODERATE 35: CPT | Performed by: INTERNAL MEDICINE

## 2021-11-06 RX ADMIN — INSULIN LISPRO 2 UNITS: 100 INJECTION, SOLUTION INTRAVENOUS; SUBCUTANEOUS at 16:15

## 2021-11-06 RX ADMIN — INSULIN LISPRO 6 UNITS: 100 INJECTION, SOLUTION INTRAVENOUS; SUBCUTANEOUS at 07:37

## 2021-11-06 RX ADMIN — REMDESIVIR 100 MG: 100 INJECTION, POWDER, LYOPHILIZED, FOR SOLUTION INTRAVENOUS at 21:38

## 2021-11-06 RX ADMIN — HEPARIN SODIUM 7500 UNITS: 5000 INJECTION INTRAVENOUS; SUBCUTANEOUS at 05:48

## 2021-11-06 RX ADMIN — ASPIRIN 81 MG: 81 TABLET, COATED ORAL at 08:47

## 2021-11-06 RX ADMIN — HEPARIN SODIUM 7500 UNITS: 5000 INJECTION INTRAVENOUS; SUBCUTANEOUS at 21:38

## 2021-11-06 RX ADMIN — Medication 6 MG: at 21:38

## 2021-11-06 RX ADMIN — HEPARIN SODIUM 7500 UNITS: 5000 INJECTION INTRAVENOUS; SUBCUTANEOUS at 13:08

## 2021-11-06 RX ADMIN — INSULIN GLARGINE 10 UNITS: 100 INJECTION, SOLUTION SUBCUTANEOUS at 21:38

## 2021-11-06 RX ADMIN — INSULIN LISPRO 4 UNITS: 100 INJECTION, SOLUTION INTRAVENOUS; SUBCUTANEOUS at 11:53

## 2021-11-06 RX ADMIN — ATORVASTATIN CALCIUM 40 MG: 40 TABLET, FILM COATED ORAL at 16:15

## 2021-11-06 RX ADMIN — DEXAMETHASONE SODIUM PHOSPHATE 6 MG: 4 INJECTION, SOLUTION INTRAMUSCULAR; INTRAVENOUS at 21:37

## 2021-11-06 RX ADMIN — INSULIN LISPRO 1 UNITS: 100 INJECTION, SOLUTION INTRAVENOUS; SUBCUTANEOUS at 21:39

## 2021-11-07 VITALS
DIASTOLIC BLOOD PRESSURE: 97 MMHG | HEART RATE: 66 BPM | WEIGHT: 282.19 LBS | RESPIRATION RATE: 20 BRPM | HEIGHT: 68 IN | BODY MASS INDEX: 42.77 KG/M2 | TEMPERATURE: 98.2 F | OXYGEN SATURATION: 93 % | SYSTOLIC BLOOD PRESSURE: 158 MMHG

## 2021-11-07 LAB
ALBUMIN SERPL BCP-MCNC: 3.1 G/DL (ref 3.5–5)
ALP SERPL-CCNC: 53 U/L (ref 46–116)
ALT SERPL W P-5'-P-CCNC: 43 U/L (ref 12–78)
ANION GAP SERPL CALCULATED.3IONS-SCNC: 11 MMOL/L (ref 4–13)
AST SERPL W P-5'-P-CCNC: 24 U/L (ref 5–45)
BASOPHILS # BLD AUTO: 0.03 THOUSANDS/ΜL (ref 0–0.1)
BASOPHILS NFR BLD AUTO: 0 % (ref 0–1)
BILIRUB SERPL-MCNC: 0.6 MG/DL (ref 0.2–1)
BUN SERPL-MCNC: 25 MG/DL (ref 5–25)
CALCIUM ALBUM COR SERPL-MCNC: 9.5 MG/DL (ref 8.3–10.1)
CALCIUM SERPL-MCNC: 8.8 MG/DL (ref 8.3–10.1)
CHLORIDE SERPL-SCNC: 99 MMOL/L (ref 100–108)
CO2 SERPL-SCNC: 24 MMOL/L (ref 21–32)
CREAT SERPL-MCNC: 1.05 MG/DL (ref 0.6–1.3)
CRP SERPL QL: 76.7 MG/L
DME PARACHUTE DELIVERY DATE ACTUAL: NORMAL
DME PARACHUTE DELIVERY DATE EXPECTED: NORMAL
DME PARACHUTE DELIVERY DATE REQUESTED: NORMAL
DME PARACHUTE ITEM DESCRIPTION: NORMAL
DME PARACHUTE ORDER STATUS: NORMAL
DME PARACHUTE SUPPLIER NAME: NORMAL
DME PARACHUTE SUPPLIER PHONE: NORMAL
EOSINOPHIL # BLD AUTO: 0 THOUSAND/ΜL (ref 0–0.61)
EOSINOPHIL NFR BLD AUTO: 0 % (ref 0–6)
ERYTHROCYTE [DISTWIDTH] IN BLOOD BY AUTOMATED COUNT: 13.1 % (ref 11.6–15.1)
GFR SERPL CREATININE-BSD FRML MDRD: 77 ML/MIN/1.73SQ M
GLUCOSE SERPL-MCNC: 178 MG/DL (ref 65–140)
GLUCOSE SERPL-MCNC: 212 MG/DL (ref 65–140)
GLUCOSE SERPL-MCNC: 254 MG/DL (ref 65–140)
HCT VFR BLD AUTO: 50.1 % (ref 36.5–49.3)
HGB BLD-MCNC: 15.9 G/DL (ref 12–17)
IMM GRANULOCYTES # BLD AUTO: 0.07 THOUSAND/UL (ref 0–0.2)
IMM GRANULOCYTES NFR BLD AUTO: 1 % (ref 0–2)
LYMPHOCYTES # BLD AUTO: 1.23 THOUSANDS/ΜL (ref 0.6–4.47)
LYMPHOCYTES NFR BLD AUTO: 12 % (ref 14–44)
MCH RBC QN AUTO: 29.2 PG (ref 26.8–34.3)
MCHC RBC AUTO-ENTMCNC: 31.7 G/DL (ref 31.4–37.4)
MCV RBC AUTO: 92 FL (ref 82–98)
MONOCYTES # BLD AUTO: 0.28 THOUSAND/ΜL (ref 0.17–1.22)
MONOCYTES NFR BLD AUTO: 3 % (ref 4–12)
NEUTROPHILS # BLD AUTO: 9.05 THOUSANDS/ΜL (ref 1.85–7.62)
NEUTS SEG NFR BLD AUTO: 84 % (ref 43–75)
NRBC BLD AUTO-RTO: 0 /100 WBCS
PLATELET # BLD AUTO: 268 THOUSANDS/UL (ref 149–390)
PMV BLD AUTO: 11 FL (ref 8.9–12.7)
POTASSIUM SERPL-SCNC: 4.6 MMOL/L (ref 3.5–5.3)
PROCALCITONIN SERPL-MCNC: 0.17 NG/ML
PROT SERPL-MCNC: 8.1 G/DL (ref 6.4–8.2)
RBC # BLD AUTO: 5.45 MILLION/UL (ref 3.88–5.62)
SODIUM SERPL-SCNC: 134 MMOL/L (ref 136–145)
WBC # BLD AUTO: 10.66 THOUSAND/UL (ref 4.31–10.16)

## 2021-11-07 PROCEDURE — 84145 PROCALCITONIN (PCT): CPT | Performed by: PHYSICIAN ASSISTANT

## 2021-11-07 PROCEDURE — 86140 C-REACTIVE PROTEIN: CPT | Performed by: INTERNAL MEDICINE

## 2021-11-07 PROCEDURE — 82948 REAGENT STRIP/BLOOD GLUCOSE: CPT

## 2021-11-07 PROCEDURE — 94761 N-INVAS EAR/PLS OXIMETRY MLT: CPT

## 2021-11-07 PROCEDURE — 99239 HOSP IP/OBS DSCHRG MGMT >30: CPT | Performed by: INTERNAL MEDICINE

## 2021-11-07 PROCEDURE — 80053 COMPREHEN METABOLIC PANEL: CPT | Performed by: INTERNAL MEDICINE

## 2021-11-07 PROCEDURE — 85025 COMPLETE CBC W/AUTO DIFF WBC: CPT | Performed by: INTERNAL MEDICINE

## 2021-11-07 RX ORDER — ASPIRIN 81 MG/1
81 TABLET ORAL DAILY
Qty: 30 TABLET | Refills: 0 | Status: SHIPPED | OUTPATIENT
Start: 2021-11-08

## 2021-11-07 RX ORDER — BENZONATATE 100 MG/1
100 CAPSULE ORAL 3 TIMES DAILY PRN
Qty: 20 CAPSULE | Refills: 0 | Status: SHIPPED | OUTPATIENT
Start: 2021-11-07

## 2021-11-07 RX ORDER — BLOOD SUGAR DIAGNOSTIC
STRIP MISCELLANEOUS
Qty: 100 EACH | Refills: 0 | Status: SHIPPED | OUTPATIENT
Start: 2021-11-07

## 2021-11-07 RX ORDER — LANCETS 33 GAUGE
EACH MISCELLANEOUS
Qty: 100 EACH | Refills: 0 | Status: SHIPPED | OUTPATIENT
Start: 2021-11-07

## 2021-11-07 RX ORDER — GLUCOSAMINE HCL/CHONDROITIN SU 500-400 MG
CAPSULE ORAL
Qty: 100 EACH | Refills: 0 | Status: SHIPPED | OUTPATIENT
Start: 2021-11-07

## 2021-11-07 RX ORDER — ATORVASTATIN CALCIUM 40 MG/1
40 TABLET, FILM COATED ORAL DAILY
Qty: 14 TABLET | Refills: 0 | Status: SHIPPED | OUTPATIENT
Start: 2021-11-07 | End: 2021-11-21

## 2021-11-07 RX ORDER — ACETAMINOPHEN 325 MG/1
650 TABLET ORAL EVERY 6 HOURS PRN
Qty: 60 TABLET | Refills: 0 | Status: SHIPPED | OUTPATIENT
Start: 2021-11-07

## 2021-11-07 RX ORDER — BLOOD-GLUCOSE METER
KIT MISCELLANEOUS
Qty: 1 KIT | Refills: 0 | Status: SHIPPED | OUTPATIENT
Start: 2021-11-07

## 2021-11-07 RX ADMIN — INSULIN LISPRO 4 UNITS: 100 INJECTION, SOLUTION INTRAVENOUS; SUBCUTANEOUS at 08:29

## 2021-11-07 RX ADMIN — ASPIRIN 81 MG: 81 TABLET, COATED ORAL at 08:29

## 2021-11-07 RX ADMIN — HEPARIN SODIUM 7500 UNITS: 5000 INJECTION INTRAVENOUS; SUBCUTANEOUS at 05:35

## 2021-11-09 LAB
BACTERIA BLD CULT: NORMAL
BACTERIA BLD CULT: NORMAL

## 2022-01-28 ENCOUNTER — HOSPITAL ENCOUNTER (OUTPATIENT)
Dept: RADIOLOGY | Facility: HOSPITAL | Age: 62
Discharge: HOME | End: 2022-01-28

## 2022-01-28 DIAGNOSIS — M79.609 PAIN IN EXTREMITY, UNSPECIFIED EXTREMITY: ICD-10-CM

## 2022-01-28 PROCEDURE — 73610 X-RAY EXAM OF ANKLE: CPT

## 2022-01-28 PROCEDURE — 73630 X-RAY EXAM OF FOOT: CPT

## 2022-02-02 ENCOUNTER — APPOINTMENT (OUTPATIENT)
Dept: LAB | Facility: HOSPITAL | Age: 62
End: 2022-02-02
Payer: COMMERCIAL

## 2022-02-02 DIAGNOSIS — M79.609 PAIN IN EXTREMITY, UNSPECIFIED EXTREMITY: ICD-10-CM

## 2022-02-02 LAB
ALBUMIN SERPL BCP-MCNC: 4 G/DL (ref 3.5–5)
ALP SERPL-CCNC: 52 U/L (ref 46–116)
ALT SERPL W P-5'-P-CCNC: 46 U/L (ref 12–78)
ANION GAP SERPL CALCULATED.3IONS-SCNC: 9 MMOL/L (ref 4–13)
AST SERPL W P-5'-P-CCNC: 26 U/L (ref 5–45)
BASOPHILS # BLD AUTO: 0.07 THOUSANDS/ΜL (ref 0–0.1)
BASOPHILS NFR BLD AUTO: 1 % (ref 0–1)
BILIRUB SERPL-MCNC: 1.6 MG/DL (ref 0.2–1)
BUN SERPL-MCNC: 20 MG/DL (ref 5–25)
CALCIUM SERPL-MCNC: 9 MG/DL (ref 8.3–10.1)
CHLORIDE SERPL-SCNC: 103 MMOL/L (ref 100–108)
CHOLEST SERPL-MCNC: 123 MG/DL
CO2 SERPL-SCNC: 27 MMOL/L (ref 21–32)
CREAT SERPL-MCNC: 1.2 MG/DL (ref 0.6–1.3)
EOSINOPHIL # BLD AUTO: 0.16 THOUSAND/ΜL (ref 0–0.61)
EOSINOPHIL NFR BLD AUTO: 2 % (ref 0–6)
ERYTHROCYTE [DISTWIDTH] IN BLOOD BY AUTOMATED COUNT: 13.6 % (ref 11.6–15.1)
EST. AVERAGE GLUCOSE BLD GHB EST-MCNC: 123 MG/DL
GFR SERPL CREATININE-BSD FRML MDRD: 65 ML/MIN/1.73SQ M
GLUCOSE P FAST SERPL-MCNC: 87 MG/DL (ref 65–99)
HBA1C MFR BLD: 5.9 %
HCT VFR BLD AUTO: 50.1 % (ref 36.5–49.3)
HDLC SERPL-MCNC: 46 MG/DL
HGB BLD-MCNC: 16.4 G/DL (ref 12–17)
IMM GRANULOCYTES # BLD AUTO: 0.04 THOUSAND/UL (ref 0–0.2)
IMM GRANULOCYTES NFR BLD AUTO: 1 % (ref 0–2)
LDLC SERPL CALC-MCNC: 66 MG/DL (ref 0–100)
LYMPHOCYTES # BLD AUTO: 2.16 THOUSANDS/ΜL (ref 0.6–4.47)
LYMPHOCYTES NFR BLD AUTO: 25 % (ref 14–44)
MCH RBC QN AUTO: 29.7 PG (ref 26.8–34.3)
MCHC RBC AUTO-ENTMCNC: 32.7 G/DL (ref 31.4–37.4)
MCV RBC AUTO: 91 FL (ref 82–98)
MONOCYTES # BLD AUTO: 0.62 THOUSAND/ΜL (ref 0.17–1.22)
MONOCYTES NFR BLD AUTO: 7 % (ref 4–12)
NEUTROPHILS # BLD AUTO: 5.49 THOUSANDS/ΜL (ref 1.85–7.62)
NEUTS SEG NFR BLD AUTO: 64 % (ref 43–75)
NONHDLC SERPL-MCNC: 77 MG/DL
NRBC BLD AUTO-RTO: 0 /100 WBCS
PLATELET # BLD AUTO: 238 THOUSANDS/UL (ref 149–390)
PMV BLD AUTO: 9.7 FL (ref 8.9–12.7)
POTASSIUM SERPL-SCNC: 3.7 MMOL/L (ref 3.5–5.3)
PROT SERPL-MCNC: 8 G/DL (ref 6.4–8.2)
PSA SERPL-MCNC: 0.9 NG/ML (ref 0–4)
RBC # BLD AUTO: 5.53 MILLION/UL (ref 3.88–5.62)
SODIUM SERPL-SCNC: 139 MMOL/L (ref 136–145)
TRIGL SERPL-MCNC: 56 MG/DL
WBC # BLD AUTO: 8.54 THOUSAND/UL (ref 4.31–10.16)

## 2022-02-02 PROCEDURE — 85025 COMPLETE CBC W/AUTO DIFF WBC: CPT

## 2022-02-02 PROCEDURE — 80061 LIPID PANEL: CPT

## 2022-02-02 PROCEDURE — G0103 PSA SCREENING: HCPCS

## 2022-02-02 PROCEDURE — 36415 COLL VENOUS BLD VENIPUNCTURE: CPT

## 2022-02-02 PROCEDURE — 83036 HEMOGLOBIN GLYCOSYLATED A1C: CPT

## 2022-02-02 PROCEDURE — 80053 COMPREHEN METABOLIC PANEL: CPT

## 2022-07-25 ENCOUNTER — APPOINTMENT (OUTPATIENT)
Dept: LAB | Facility: HOSPITAL | Age: 62
End: 2022-07-25
Payer: COMMERCIAL

## 2022-07-25 DIAGNOSIS — E78.5 HYPERLIPIDEMIA, UNSPECIFIED HYPERLIPIDEMIA TYPE: ICD-10-CM

## 2022-07-25 DIAGNOSIS — E55.9 AVITAMINOSIS D: ICD-10-CM

## 2022-07-25 DIAGNOSIS — Z12.5 SPECIAL SCREENING FOR MALIGNANT NEOPLASM OF PROSTATE: ICD-10-CM

## 2022-07-25 DIAGNOSIS — E11.8 DIABETIC COMPLICATION (HCC): ICD-10-CM

## 2022-07-25 LAB
25(OH)D3 SERPL-MCNC: 30.9 NG/ML (ref 30–100)
ALBUMIN SERPL BCP-MCNC: 3.9 G/DL (ref 3.5–5)
ALP SERPL-CCNC: 61 U/L (ref 46–116)
ALT SERPL W P-5'-P-CCNC: 45 U/L (ref 12–78)
ANION GAP SERPL CALCULATED.3IONS-SCNC: 9 MMOL/L (ref 4–13)
AST SERPL W P-5'-P-CCNC: 32 U/L (ref 5–45)
BILIRUB SERPL-MCNC: 1.9 MG/DL (ref 0.2–1)
BUN SERPL-MCNC: 24 MG/DL (ref 5–25)
CALCIUM SERPL-MCNC: 9.3 MG/DL (ref 8.3–10.1)
CHLORIDE SERPL-SCNC: 100 MMOL/L (ref 96–108)
CHOLEST SERPL-MCNC: 161 MG/DL
CO2 SERPL-SCNC: 26 MMOL/L (ref 21–32)
CREAT SERPL-MCNC: 1.32 MG/DL (ref 0.6–1.3)
GFR SERPL CREATININE-BSD FRML MDRD: 58 ML/MIN/1.73SQ M
GLUCOSE P FAST SERPL-MCNC: 87 MG/DL (ref 65–99)
HDLC SERPL-MCNC: 46 MG/DL
LDLC SERPL CALC-MCNC: 102 MG/DL (ref 0–100)
NONHDLC SERPL-MCNC: 115 MG/DL
POTASSIUM SERPL-SCNC: 3.7 MMOL/L (ref 3.5–5.3)
PROT SERPL-MCNC: 8.1 G/DL (ref 6.4–8.4)
PSA SERPL-MCNC: 0.8 NG/ML (ref 0–4)
SODIUM SERPL-SCNC: 135 MMOL/L (ref 135–147)
TRIGL SERPL-MCNC: 64 MG/DL

## 2022-07-25 PROCEDURE — 80053 COMPREHEN METABOLIC PANEL: CPT

## 2022-07-25 PROCEDURE — 80061 LIPID PANEL: CPT

## 2022-07-25 PROCEDURE — G0103 PSA SCREENING: HCPCS

## 2022-07-25 PROCEDURE — 83036 HEMOGLOBIN GLYCOSYLATED A1C: CPT

## 2022-07-25 PROCEDURE — 82306 VITAMIN D 25 HYDROXY: CPT

## 2022-07-25 PROCEDURE — 36415 COLL VENOUS BLD VENIPUNCTURE: CPT

## 2022-07-26 LAB
EST. AVERAGE GLUCOSE BLD GHB EST-MCNC: 114 MG/DL
HBA1C MFR BLD: 5.6 %

## 2022-10-12 PROBLEM — J12.82 PNEUMONIA DUE TO COVID-19 VIRUS: Status: RESOLVED | Noted: 2021-11-04 | Resolved: 2022-10-12

## 2022-10-12 PROBLEM — U07.1 PNEUMONIA DUE TO COVID-19 VIRUS: Status: RESOLVED | Noted: 2021-11-04 | Resolved: 2022-10-12

## 2022-10-12 PROBLEM — A41.89 SEPSIS DUE TO COVID-19 (HCC): Status: RESOLVED | Noted: 2021-11-04 | Resolved: 2022-10-12

## 2022-10-12 PROBLEM — U07.1 SEPSIS DUE TO COVID-19 (HCC): Status: RESOLVED | Noted: 2021-11-04 | Resolved: 2022-10-12

## 2022-12-07 ENCOUNTER — PREP FOR PROCEDURE (OUTPATIENT)
Dept: GASTROENTEROLOGY | Facility: CLINIC | Age: 62
End: 2022-12-07

## 2022-12-07 ENCOUNTER — TELEPHONE (OUTPATIENT)
Dept: GASTROENTEROLOGY | Facility: CLINIC | Age: 62
End: 2022-12-07

## 2022-12-07 DIAGNOSIS — Z12.11 SCREENING FOR COLON CANCER: Primary | ICD-10-CM

## 2022-12-07 NOTE — TELEPHONE ENCOUNTER
12/07/22  Screened by: Santo Gilliam MA    Referring Provider DANIS    Pre- Screening: There is no height or weight on file to calculate BMI  Has patient been referred for a routine screening Colonoscopy? yes  Is the patient between 39-70 years old? yes      Previous Colonoscopy no   If yes:    Date:     Facility:     Reason:       SCHEDULING STAFF: If the patient is between 45yrs-49yrs, please advise patient to confirm benefits/coverage with their insurance company for a routine screening colonoscopy, some insurance carriers will only cover at [address] or older  If the patient is over 66years old, please schedule an office visit  Does the patient want to see a Gastroenterologist prior to their procedure OR are they having any GI symptoms? no    Has the patient been hospitalized or had abdominal surgery in the past 6 months? no    Does the patient use supplemental oxygen? no    Does the patient take Coumadin, Lovenox, Plavix, Elliquis, Xarelto, or other blood thinning medication? no    Has the patient had a stroke, cardiac event, or stent placed in the past year? no    SCHEDULING STAFF: If patient answers NO to above questions, then schedule procedure  If patient answers YES to above questions, then schedule office appointment  If patient is between 45yrs - 49yrs, please advise patient that we will have to confirm benefits & coverage with their insurance company for a routine screening colonoscopy

## 2022-12-07 NOTE — TELEPHONE ENCOUNTER
Scheduled date of colonoscopy (as of today):01/11/23  Physician performing colonoscopy:OSIRIS  Location of colonoscopy:[facility]  Bowel prep reviewed with patient: TO REVIEW WITH PT  Instructions reviewed with patient by: TO REVIEW WITH PT  Clearances: NONE

## 2022-12-07 NOTE — TELEPHONE ENCOUNTER
ASC Assessment    If the patient answers yes to any of these questions, schedule in a hospital  Are you pregnant: No  Do you rely on a wheelchair for mobility: No  Have you been diagnosed with End Stage Renal Disease (ESRD): No  Do you need oxygen during the day: No  Have you had a heart attack or stroke within the past three months: No  Have you had a seizure within the past three months: No  Have you ever been informed by anesthesia that you have a difficult airway: No  Additional Questions  Have you had any cardiac testing or are under the care of a Cardiologist (see cardiac list): No  Cardiac list:   Do you have an implanted cardiac defibrillator: No (Comment:  This patient should be scheduled in the hospital)    Have any bleeding problems, such as anemia or hemophilia (If patient has H&H result below 8, schedule in hospital   H&H must be within 30 days of procedure): No    Had an organ transplant within the past 3 months: No    Do you have any present infections: No  Do you get short of breath when walking a few blocks: No  Have you been diagnosed with diabetes: No  Comments (provide cardiac provider information if applicable): Spine appears normal, range of motion is not limited, no mildline spinal ttp b/l paralumbar ttp

## 2023-02-11 ENCOUNTER — HOSPITAL ENCOUNTER (EMERGENCY)
Facility: HOSPITAL | Age: 63
Discharge: HOME | End: 2023-02-11
Attending: EMERGENCY MEDICINE

## 2023-02-11 ENCOUNTER — APPOINTMENT (EMERGENCY)
Dept: CT IMAGING | Facility: HOSPITAL | Age: 63
End: 2023-02-11

## 2023-02-11 VITALS
DIASTOLIC BLOOD PRESSURE: 80 MMHG | RESPIRATION RATE: 17 BRPM | OXYGEN SATURATION: 99 % | TEMPERATURE: 97.3 F | HEART RATE: 78 BPM | SYSTOLIC BLOOD PRESSURE: 134 MMHG

## 2023-02-11 DIAGNOSIS — I31.39 PERICARDIAL EFFUSION: ICD-10-CM

## 2023-02-11 DIAGNOSIS — R59.0 MESENTERIC LYMPHADENOPATHY: ICD-10-CM

## 2023-02-11 DIAGNOSIS — K42.9 UMBILICAL HERNIA: ICD-10-CM

## 2023-02-11 DIAGNOSIS — R10.9 ABDOMINAL PAIN: Primary | ICD-10-CM

## 2023-02-11 DIAGNOSIS — R19.7 DIARRHEA: ICD-10-CM

## 2023-02-11 LAB
ALBUMIN SERPL BCP-MCNC: 3.9 G/DL (ref 3.5–5)
ALP SERPL-CCNC: 64 U/L (ref 46–116)
ALT SERPL W P-5'-P-CCNC: 33 U/L (ref 12–78)
ANION GAP SERPL CALCULATED.3IONS-SCNC: 11 MMOL/L (ref 4–13)
AST SERPL W P-5'-P-CCNC: 22 U/L (ref 5–45)
BACTERIA UR QL AUTO: NORMAL /HPF
BASOPHILS # BLD AUTO: 0.04 THOUSANDS/ÂΜL (ref 0–0.1)
BASOPHILS NFR BLD AUTO: 0 % (ref 0–1)
BILIRUB SERPL-MCNC: 1.2 MG/DL (ref 0.2–1)
BILIRUB UR QL STRIP: NEGATIVE
BUN SERPL-MCNC: 29 MG/DL (ref 5–25)
CALCIUM SERPL-MCNC: 9.1 MG/DL (ref 8.3–10.1)
CHLORIDE SERPL-SCNC: 103 MMOL/L (ref 96–108)
CLARITY UR: CLEAR
CO2 SERPL-SCNC: 24 MMOL/L (ref 21–32)
COLOR UR: YELLOW
CREAT SERPL-MCNC: 1.3 MG/DL (ref 0.6–1.3)
EOSINOPHIL # BLD AUTO: 0.37 THOUSAND/ÂΜL (ref 0–0.61)
EOSINOPHIL NFR BLD AUTO: 2 % (ref 0–6)
ERYTHROCYTE [DISTWIDTH] IN BLOOD BY AUTOMATED COUNT: 12.9 % (ref 11.6–15.1)
GFR SERPL CREATININE-BSD FRML MDRD: 59 ML/MIN/1.73SQ M
GLUCOSE SERPL-MCNC: 87 MG/DL (ref 65–140)
GLUCOSE UR STRIP-MCNC: NEGATIVE MG/DL
HCT VFR BLD AUTO: 52.8 % (ref 36.5–49.3)
HGB BLD-MCNC: 17.3 G/DL (ref 12–17)
HGB UR QL STRIP.AUTO: NEGATIVE
IMM GRANULOCYTES # BLD AUTO: 0.04 THOUSAND/UL (ref 0–0.2)
IMM GRANULOCYTES NFR BLD AUTO: 0 % (ref 0–2)
KETONES UR STRIP-MCNC: NEGATIVE MG/DL
LEUKOCYTE ESTERASE UR QL STRIP: NEGATIVE
LIPASE SERPL-CCNC: 141 U/L (ref 73–393)
LYMPHOCYTES # BLD AUTO: 2.41 THOUSANDS/ÂΜL (ref 0.6–4.47)
LYMPHOCYTES NFR BLD AUTO: 16 % (ref 14–44)
MCH RBC QN AUTO: 29.9 PG (ref 26.8–34.3)
MCHC RBC AUTO-ENTMCNC: 32.8 G/DL (ref 31.4–37.4)
MCV RBC AUTO: 91 FL (ref 82–98)
MONOCYTES # BLD AUTO: 1.07 THOUSAND/ÂΜL (ref 0.17–1.22)
MONOCYTES NFR BLD AUTO: 7 % (ref 4–12)
NEUTROPHILS # BLD AUTO: 11.22 THOUSANDS/ÂΜL (ref 1.85–7.62)
NEUTS SEG NFR BLD AUTO: 75 % (ref 43–75)
NITRITE UR QL STRIP: NEGATIVE
NON-SQ EPI CELLS URNS QL MICRO: NORMAL /HPF
NRBC BLD AUTO-RTO: 0 /100 WBCS
PH UR STRIP.AUTO: 5.5 [PH]
PLATELET # BLD AUTO: 278 THOUSANDS/UL (ref 149–390)
PMV BLD AUTO: 9.7 FL (ref 8.9–12.7)
POTASSIUM SERPL-SCNC: 4 MMOL/L (ref 3.5–5.3)
PROT SERPL-MCNC: 7.9 G/DL (ref 6.4–8.4)
PROT UR STRIP-MCNC: ABNORMAL MG/DL
RBC # BLD AUTO: 5.79 MILLION/UL (ref 3.88–5.62)
RBC #/AREA URNS AUTO: NORMAL /HPF
SODIUM SERPL-SCNC: 138 MMOL/L (ref 135–147)
SP GR UR STRIP.AUTO: >=1.03 (ref 1–1.03)
UROBILINOGEN UR STRIP-ACNC: <2 MG/DL
WBC # BLD AUTO: 15.15 THOUSAND/UL (ref 4.31–10.16)
WBC #/AREA URNS AUTO: NORMAL /HPF

## 2023-02-11 RX ORDER — DICYCLOMINE HCL 20 MG
20 TABLET ORAL EVERY 6 HOURS
Qty: 20 TABLET | Refills: 0 | Status: SHIPPED | OUTPATIENT
Start: 2023-02-11

## 2023-02-11 RX ADMIN — IOHEXOL 100 ML: 350 INJECTION, SOLUTION INTRAVENOUS at 11:27

## 2023-02-11 RX ADMIN — SODIUM CHLORIDE 1000 ML: 0.9 INJECTION, SOLUTION INTRAVENOUS at 10:32

## 2023-02-11 NOTE — ED PROVIDER NOTES
History  Chief Complaint   Patient presents with   • Abdominal Pain     PT has lower abd pain for a week  Pt seen by pcp a week ago and was told it was a GI bug  Pt has n/d  Pt denies fevers  Patient is a 62 y/o M with h/o DM, Diverticulitis, HTN that presents to the ED with LLQ abdominal pain and diarrhea x 1 week  He has nausea and loss of appetite  Last episode of diarrhea was 2 hours ago  H denies fevers, chills  No sick contacts, foreign travel  He states he thinks he "ate a bad apple "  No blood in stool  He states this does not feel similar to the diverticulitis in the past because he "has not had seeds "       History provided by:  Patient  Abdominal Pain  Pain location:  LLQ  Pain quality: aching    Pain radiates to:  Does not radiate  Pain severity:  Moderate  Onset quality:  Gradual  Duration:  1 week  Timing:  Constant  Progression:  Unchanged  Chronicity:  New  Context: not sick contacts, not suspicious food intake and not trauma    Relieved by:  Nothing  Worsened by:  Nothing  Ineffective treatments:  None tried  Associated symptoms: anorexia, diarrhea and nausea    Associated symptoms: no chest pain, no chills, no constipation, no cough, no dysuria, no fever, no shortness of breath and no vomiting    Risk factors: obesity    Risk factors: no recent hospitalization        Prior to Admission Medications   Prescriptions Last Dose Informant Patient Reported? Taking? Alcohol Swabs 70 % PADS   No No   Sig: May substitute brand based on insurance coverage  Check glucose BID  Blood Glucose Monitoring Suppl (OneTouch Verio Reflect) w/Device KIT   No No   Sig: May substitute brand based on insurance coverage  Check glucose BID  OneTouch Delica Lancets 16U MISC   No No   Sig: May substitute brand based on insurance coverage   Check glucose BID    acetaminophen (TYLENOL) 325 mg tablet   No No   Sig: Take 2 tablets (650 mg total) by mouth every 6 (six) hours as needed for mild pain, headaches or fever aspirin (ECOTRIN LOW STRENGTH) 81 mg EC tablet   No No   Sig: Take 1 tablet (81 mg total) by mouth daily   atorvastatin (LIPITOR) 40 mg tablet   No No   Sig: Take 1 tablet (40 mg total) by mouth daily for 14 days   benzonatate (TESSALON PERLES) 100 mg capsule   No No   Sig: Take 1 capsule (100 mg total) by mouth 3 (three) times a day as needed for cough   glucose blood (OneTouch Verio) test strip   No No   Sig: May substitute brand based on insurance coverage  Check glucose BID  Facility-Administered Medications: None       Past Medical History:   Diagnosis Date   • Diabetes mellitus ([facility] [address] )    • Diverticulitis of intestine    • Hypertension        Past Surgical History:   Procedure Laterality Date   • CHOLECYSTECTOMY     • HERNIA REPAIR      Umbilical hernia repair with mesh       Family History   Problem Relation Age of Onset   • Dementia Father      I have reviewed and agree with the history as documented  E-Cigarette/Vaping     E-Cigarette/Vaping Substances     Social History     Tobacco Use   • Smoking status: Never   • Smokeless tobacco: Never   Substance Use Topics   • Alcohol use: Never     Alcohol/week: 0 0 standard drinks   • Drug use: No       Review of Systems   Constitutional: Negative for chills and fever  HENT: Negative  Respiratory: Negative for cough and shortness of breath  Cardiovascular: Negative for chest pain  Gastrointestinal: Positive for abdominal pain, anorexia, diarrhea and nausea  Negative for constipation and vomiting  Genitourinary: Negative for dysuria and frequency  Musculoskeletal: Negative for back pain and neck pain  Skin: Negative for color change, pallor and rash  Neurological: Negative for dizziness, weakness, light-headedness and numbness  Psychiatric/Behavioral: Negative for confusion  All other systems reviewed and are negative  Physical Exam  Physical Exam  Vitals and nursing note reviewed     Constitutional:       General: He is not in acute distress  Appearance: Normal appearance  He is well-developed, well-groomed and overweight  He is not ill-appearing or diaphoretic  HENT:      Head: Normocephalic and atraumatic  Right Ear: External ear normal       Left Ear: External ear normal       Nose: Nose normal       Mouth/Throat:      Mouth: Mucous membranes are moist    Eyes:      Conjunctiva/sclera: Conjunctivae normal       Pupils: Pupils are equal    Cardiovascular:      Rate and Rhythm: Normal rate and regular rhythm  Heart sounds: Normal heart sounds  Pulmonary:      Effort: Pulmonary effort is normal       Breath sounds: Normal breath sounds  No wheezing, rhonchi or rales  Abdominal:      General: Abdomen is flat  Bowel sounds are normal       Palpations: Abdomen is soft  Tenderness: There is abdominal tenderness in the left upper quadrant and left lower quadrant  There is no guarding or rebound  Musculoskeletal:         General: Normal range of motion  Cervical back: Normal range of motion  Skin:     General: Skin is warm and dry  Coloration: Skin is pale  Skin is not jaundiced  Findings: No rash  Neurological:      General: No focal deficit present  Mental Status: He is alert and oriented to person, place, and time  Motor: No weakness  Psychiatric:         Mood and Affect: Mood normal          Behavior: Behavior is cooperative           Vital Signs  ED Triage Vitals   Temperature Pulse Respirations Blood Pressure SpO2   02/11/23 1034 02/11/23 1017 02/11/23 1017 02/11/23 1017 02/11/23 1017   (!) 97 3 °F (36 3 °C) 79 18 137/88 98 %      Temp Source Heart Rate Source Patient Position - Orthostatic VS BP Location FiO2 (%)   02/11/23 1034 -- -- -- --   Oral          Pain Score       02/11/23 1017       7           Vitals:    02/11/23 1017 02/11/23 1100 02/11/23 1200   BP: 137/88 117/75 134/80   Pulse: 79 81 78         Visual Acuity      ED Medications  Medications   sodium chloride 0 9 % bolus 1,000 mL (0 mL Intravenous Stopped 2/11/23 1255)   iohexol (OMNIPAQUE) 350 MG/ML injection (SINGLE-DOSE) 100 mL (100 mL Intravenous Given 2/11/23 1127)       Diagnostic Studies  Results Reviewed     Procedure Component Value Units Date/Time    Urine Microscopic [[id number]]  (Normal) Collected: 02/11/23 1038    Lab Status: Final result Specimen: Urine, Clean Catch Updated: 02/11/23 1113     RBC, UA 0-1 /hpf      WBC, UA 0-1 /hpf      Epithelial Cells Occasional /hpf      Bacteria, UA None Seen /hpf     Comprehensive metabolic panel [[id number]]  (Abnormal) Collected: 02/11/23 1032    Lab Status: Final result Specimen: Blood from Arm, Left Updated: 02/11/23 1058     Sodium 138 mmol/L      Potassium 4 0 mmol/L      Chloride 103 mmol/L      CO2 24 mmol/L      ANION GAP 11 mmol/L      BUN 29 mg/dL      Creatinine 1 30 mg/dL      Glucose 87 mg/dL      Calcium 9 1 mg/dL      AST 22 U/L      ALT 33 U/L      Alkaline Phosphatase 64 U/L      Total Protein 7 9 g/dL      Albumin 3 9 g/dL      Total Bilirubin 1 20 mg/dL      eGFR 59 ml/min/1 73sq m     Narrative:      [place] guidelines for Chronic Kidney Disease (CKD):   •  Stage 1 with normal or high GFR (GFR > 90 mL/min/1 73 square meters)  •  Stage 2 Mild CKD (GFR = 60-89 mL/min/1 73 square meters)  •  Stage 3A Moderate CKD (GFR = 45-59 mL/min/1 73 square meters)  •  Stage 3B Moderate CKD (GFR = 30-44 mL/min/1 73 square meters)  •  Stage 4 Severe CKD (GFR = 15-29 mL/min/1 73 square meters)  •  Stage 5 End Stage CKD (GFR <15 mL/min/1 73 square meters)  Note: GFR calculation is accurate only with a steady state creatinine    Lipase [[id number]]  (Normal) Collected: 02/11/23 1032    Lab Status: Final result Specimen: Blood from Arm, Left Updated: 02/11/23 1058     Lipase 141 u/L     UA w Reflex to Microscopic w Reflex to Culture [[id number]]  (Abnormal) Collected: 02/11/23 1038    Lab Status: Final result Specimen: Urine, Clean Catch Updated: 02/11/23 1052     Color, UA Yellow     Clarity, UA Clear     Specific Gravity, UA >=1 030     pH, UA 5 5     Leukocytes, UA Negative     Nitrite, UA Negative     Protein, UA Trace mg/dl      Glucose, UA Negative mg/dl      Ketones, UA Negative mg/dl      Urobilinogen, UA <2 0 mg/dl      Bilirubin, UA Negative     Occult Blood, UA Negative    CBC and differential [[id number]]  (Abnormal) Collected: 02/11/23 1032    Lab Status: Final result Specimen: Blood from Arm, Left Updated: 02/11/23 1038     WBC 15 15 Thousand/uL      RBC 5 79 Million/uL      Hemoglobin 17 3 g/dL      Hematocrit 52 8 %      MCV 91 fL      MCH 29 9 pg      MCHC 32 8 g/dL      RDW 12 9 %      MPV 9 7 fL      Platelets 343 Thousands/uL      nRBC 0 /100 WBCs      Neutrophils Relative 75 %      Immat GRANS % 0 %      Lymphocytes Relative 16 %      Monocytes Relative 7 %      Eosinophils Relative 2 %      Basophils Relative 0 %      Neutrophils Absolute 11 22 Thousands/µL      Immature Grans Absolute 0 04 Thousand/uL      Lymphocytes Absolute 2 41 Thousands/µL      Monocytes Absolute 1 07 Thousand/µL      Eosinophils Absolute 0 37 Thousand/µL      Basophils Absolute 0 04 Thousands/µL     Clostridium difficile toxin by PCR with EIA [[id number]]     Lab Status: No result Specimen: Stool from Rectum     Stool Enteric Bacterial Panel by PCR [[id number]]     Lab Status: No result Specimen: Stool from Rectum                  CT abdomen pelvis with contrast   Final Result by Miguel Angel Vilchis MD (02/11 1239)      Fluid throughout the colon suggesting diarrheal illness  Probable left lower pole renal cyst measuring 9 mm  Mild mesenteric adenopathy  This might be reactive given the suspected diarrheal illness although consider follow-up CT abdomen and pelvis in 3-6 months for reassessment  Prior sigmoid colon surgery without complication seen  Fat-containing umbilical hernia  Coronary artery calcification        Trace pericardial effusion  The study was marked in [facility] for immediate notification  Workstation performed: [id number]                    Procedures  Procedures         ED Course                                             Medical Decision Making  Patient with diarrhea, LLQ abdominal pain, will order labs, CT scan to r/o electrolyte abnormality, dehydration, colitis or diverticulitis  No acute abnormalities on CT Scan, will prescribe bentyl for diarrhea and abdominal pain  Patient unable to give stool specimen while in the ER  Will refer to GI for recheck  Return precautions given  Patient informed of incidental findings on CT  Will refer to cardiology for trace pericardial effusion, advised f/u with PCP concerning lymphadenopathy  BRAT diet suggested  Abdominal pain: acute illness or injury  Diarrhea: acute illness or injury  Mesenteric lymphadenopathy: acute illness or injury  Pericardial effusion: acute illness or injury  Umbilical hernia: acute illness or injury  Amount and/or Complexity of Data Reviewed  Labs: ordered  Radiology: ordered  Risk  Prescription drug management            Disposition  Final diagnoses:   Abdominal pain   Diarrhea   Umbilical hernia   Pericardial effusion   Mesenteric lymphadenopathy     Time reflects when diagnosis was documented in both MDM as applicable and the Disposition within this note     Time User Action Codes Description Comment    2/11/2023 12:51 PM Malva Danes Add [R10 9] Abdominal pain     2/11/2023 12:51 PM Malva Danes Add [R19 7] Diarrhea     2/11/2023 12:51 PM Malva Danes Add [O82 2] Umbilical hernia     7/03/2332 12:51 PM Malva Danes Add [I31 39] Pericardial effusion     2/11/2023 12:52 PM Malva Danes Add [R59 0] Mesenteric lymphadenopathy       ED Disposition     ED Disposition   Discharge    Condition   Stable    Date/Time   Sat Feb 11, 2023 12:46 PM    Comment   Jolene Garcia discharge to home/self care                Follow-up Information     Follow up With Specialties Details Why Contact Info Additional [address]  [facility] Gastroenterology Schedule an appointment as soon as possible for a visit  For recheck [address] [place] [address]  [address] [facility] Gastro[facility]ology Specialists [facility], [place] [address], [address]     [phone]    [facility] Cardiology [facility] Cardiology Schedule an appointment as soon as possible for a visit  for further evaluation of pericardial effusion  [address] [facility] [address]  [address] Cardiology [facility], [address] [facility], [place], [place], [facility]    [facility], [address] Internal Medicine Schedule an appointment as soon as possible for a visit  for repeat CT scan when symptoms improved to assess enlarged lymph nodes in abdomen  [address] [address]  [address] [facility],[address] Emergency Department Emergency Medicine Go to  If symptoms worsen [address] [place], [address]  [address] [facility] Emergency Department, [address] [place], [facility], [address]          Discharge Medication List as of 2/11/2023 12:54 PM      START taking these medications    Details   dicyclomine (BENTYL) 20 mg tablet Take 1 tablet (20 mg total) by mouth every 6 (six) hours, Starting Sat 2/11/2023, Normal         CONTINUE these medications which have NOT CHANGED    Details   acetaminophen (TYLENOL) 325 mg tablet Take 2 tablets (650 mg total) by mouth every 6 (six) hours as needed for mild pain, headaches or fever, Starting Sun 11/7/2021, Normal      Alcohol Swabs 70 % PADS May substitute brand based on insurance coverage   Check glucose BID , Normal      aspirin (ECOTRIN LOW STRENGTH) 81 mg EC tablet Take 1 tablet (81 mg total) by mouth daily, Starting Mon 11/8/2021, Normal      atorvastatin (LIPITOR) 40 mg tablet Take 1 tablet (40 mg total) by mouth daily for 14 days, Starting Sun 11/7/2021, Until Sun 11/21/2021, Normal      benzonatate (TESSALON PERLES) 100 mg capsule Take 1 capsule (100 mg total) by mouth 3 (three) times a day as needed for cough, Starting Sun 11/7/2021, Normal      Blood Glucose Monitoring Suppl (OneTouch Verio Reflect) w/Device KIT May substitute brand based on insurance coverage  Check glucose BID , Normal      glucose blood (OneTouch Verio) test strip May substitute brand based on insurance coverage  Check glucose BID , Normal      OneTouch Delica Lancets 68H MISC May substitute brand based on insurance coverage  Check glucose BID , Normal             No discharge procedures on file      PDMP Review     None          ED Provider  Electronically Signed by           Ketan Almeida PA-C  02/11/23 5549

## 2023-02-11 NOTE — DISCHARGE INSTRUCTIONS
Rest, increase fluids  Take bentyl as needed for diarrhea and pain  BRAT diet - bananas, rice, applesauce, toast   Follow up with GI doctor for recheck  REturn to ER if symptoms worsen  Follow up with cardiologist about pericardial effusion

## 2023-02-15 ENCOUNTER — TELEPHONE (OUTPATIENT)
Dept: GASTROENTEROLOGY | Facility: CLINIC | Age: 63
End: 2023-02-15

## 2023-02-15 NOTE — TELEPHONE ENCOUNTER
Patients GI provider:  Dr Srinivasan Boswell    Number to return call: [phone]    Reason for call: Pt's mom calling, pt seen in [facility] he has diverticulitis  Requests Dr Srinivasan Boswell  Please call her to schedule sooner appointment      Scheduled procedure/appointment date if applicable: Apt/procedure 5/18/23

## 2023-02-15 NOTE — TELEPHONE ENCOUNTER
Called pt's mom back, pt was in [address] [place] [address] Street and is requesting a sooner apt than 5/18  Rescheduled to 3/22 and instructed her to call back if pain continues or worsens in any way and will work on a sooner apt  She verbalized understanding and appreciated the apt

## 2023-02-15 NOTE — TELEPHONE ENCOUNTER
Pt's mother returned call requesting to speak with Brittany Cueva  She can be reached at [phone]

## 2023-02-15 NOTE — TELEPHONE ENCOUNTER
Attempted to call pt's # and no answer  Called and left a message at the # provided for mom and requested a call back      Pt was seen in [place] [address] ER 2/11 for abdominal pain, prescribed Bentyl and asked to schedule an apt with GI

## 2023-02-16 NOTE — TELEPHONE ENCOUNTER
Called Mom back and offered an apt today or tomorrow  Pt is at work presently  She tried to reach him but was unsuccessful  She will continue to try and may call back to see if the apts are still available

## 2023-02-16 NOTE — TELEPHONE ENCOUNTER
Mom called back and scheduled an apt for 2/21, today's apts are filled and unable to take tomorrow's apt due to his work schedule  She was also notified to request a referral from his PCP

## 2023-02-18 ENCOUNTER — HOSPITAL ENCOUNTER (OUTPATIENT)
Dept: MRI IMAGING | Facility: HOSPITAL | Age: 63
Discharge: HOME | End: 2023-02-18

## 2023-02-18 DIAGNOSIS — M25.562 LEFT KNEE PAIN, UNSPECIFIED CHRONICITY: ICD-10-CM

## 2023-07-24 ENCOUNTER — APPOINTMENT (OUTPATIENT)
Dept: LAB | Facility: HOSPITAL | Age: 63
End: 2023-07-24
Payer: COMMERCIAL

## 2023-07-24 DIAGNOSIS — E11.9 DIABETES MELLITUS WITHOUT COMPLICATION (HCC): ICD-10-CM

## 2023-07-24 DIAGNOSIS — I10 ESSENTIAL HYPERTENSION, MALIGNANT: ICD-10-CM

## 2023-07-24 LAB
ALBUMIN SERPL BCP-MCNC: 4.5 G/DL (ref 3.5–5)
ALP SERPL-CCNC: 52 U/L (ref 34–104)
ALT SERPL W P-5'-P-CCNC: 32 U/L (ref 7–52)
ANION GAP SERPL CALCULATED.3IONS-SCNC: 6 MMOL/L
AST SERPL W P-5'-P-CCNC: 28 U/L (ref 13–39)
BASOPHILS # BLD AUTO: 0.05 THOUSANDS/ÂΜL (ref 0–0.1)
BASOPHILS NFR BLD AUTO: 1 % (ref 0–1)
BILIRUB SERPL-MCNC: 2.17 MG/DL (ref 0.2–1)
BUN SERPL-MCNC: 19 MG/DL (ref 5–25)
CALCIUM SERPL-MCNC: 9.5 MG/DL (ref 8.4–10.2)
CHLORIDE SERPL-SCNC: 106 MMOL/L (ref 96–108)
CHOLEST SERPL-MCNC: 151 MG/DL
CO2 SERPL-SCNC: 26 MMOL/L (ref 21–32)
CREAT SERPL-MCNC: 1.2 MG/DL (ref 0.6–1.3)
EOSINOPHIL # BLD AUTO: 0.24 THOUSAND/ÂΜL (ref 0–0.61)
EOSINOPHIL NFR BLD AUTO: 3 % (ref 0–6)
ERYTHROCYTE [DISTWIDTH] IN BLOOD BY AUTOMATED COUNT: 14 % (ref 11.6–15.1)
EST. AVERAGE GLUCOSE BLD GHB EST-MCNC: 100 MG/DL
GFR SERPL CREATININE-BSD FRML MDRD: 65 ML/MIN/1.73SQ M
GLUCOSE P FAST SERPL-MCNC: 87 MG/DL (ref 65–99)
HBA1C MFR BLD: 5.1 %
HCT VFR BLD AUTO: 48.5 % (ref 36.5–49.3)
HDLC SERPL-MCNC: 50 MG/DL
HGB BLD-MCNC: 16.4 G/DL (ref 12–17)
IMM GRANULOCYTES # BLD AUTO: 0.03 THOUSAND/UL (ref 0–0.2)
IMM GRANULOCYTES NFR BLD AUTO: 0 % (ref 0–2)
LDLC SERPL CALC-MCNC: 90 MG/DL (ref 0–100)
LYMPHOCYTES # BLD AUTO: 1.71 THOUSANDS/ÂΜL (ref 0.6–4.47)
LYMPHOCYTES NFR BLD AUTO: 19 % (ref 14–44)
MCH RBC QN AUTO: 30.4 PG (ref 26.8–34.3)
MCHC RBC AUTO-ENTMCNC: 33.8 G/DL (ref 31.4–37.4)
MCV RBC AUTO: 90 FL (ref 82–98)
MONOCYTES # BLD AUTO: 0.89 THOUSAND/ÂΜL (ref 0.17–1.22)
MONOCYTES NFR BLD AUTO: 10 % (ref 4–12)
NEUTROPHILS # BLD AUTO: 6.22 THOUSANDS/ÂΜL (ref 1.85–7.62)
NEUTS SEG NFR BLD AUTO: 67 % (ref 43–75)
NONHDLC SERPL-MCNC: 101 MG/DL
NRBC BLD AUTO-RTO: 0 /100 WBCS
PLATELET # BLD AUTO: 228 THOUSANDS/UL (ref 149–390)
PMV BLD AUTO: 10 FL (ref 8.9–12.7)
POTASSIUM SERPL-SCNC: 3.8 MMOL/L (ref 3.5–5.3)
PROT SERPL-MCNC: 7.7 G/DL (ref 6.4–8.4)
PSA SERPL-MCNC: 0.87 NG/ML (ref 0–4)
RBC # BLD AUTO: 5.4 MILLION/UL (ref 3.88–5.62)
SODIUM SERPL-SCNC: 138 MMOL/L (ref 135–147)
TRIGL SERPL-MCNC: 54 MG/DL
WBC # BLD AUTO: 9.14 THOUSAND/UL (ref 4.31–10.16)

## 2023-07-24 PROCEDURE — G0103 PSA SCREENING: HCPCS

## 2023-07-24 PROCEDURE — 36415 COLL VENOUS BLD VENIPUNCTURE: CPT

## 2023-07-24 PROCEDURE — 83036 HEMOGLOBIN GLYCOSYLATED A1C: CPT

## 2023-07-24 PROCEDURE — 80061 LIPID PANEL: CPT

## 2023-07-24 PROCEDURE — 85025 COMPLETE CBC W/AUTO DIFF WBC: CPT

## 2023-07-24 PROCEDURE — 80053 COMPREHEN METABOLIC PANEL: CPT

## 2024-01-18 ENCOUNTER — APPOINTMENT (OUTPATIENT)
Dept: LAB | Facility: HOSPITAL | Age: 64
End: 2024-01-18
Payer: COMMERCIAL

## 2024-01-18 DIAGNOSIS — E11.8 DIABETIC COMPLICATION (HCC): ICD-10-CM

## 2024-01-18 LAB
EST. AVERAGE GLUCOSE BLD GHB EST-MCNC: 100 MG/DL
HBA1C MFR BLD: 5.1 %

## 2024-01-18 PROCEDURE — 83036 HEMOGLOBIN GLYCOSYLATED A1C: CPT

## 2024-01-18 PROCEDURE — 36415 COLL VENOUS BLD VENIPUNCTURE: CPT

## 2024-06-06 ENCOUNTER — HOSPITAL ENCOUNTER (OUTPATIENT)
Dept: CT IMAGING | Facility: HOSPITAL | Age: 64
Discharge: HOME | End: 2024-06-06
Payer: COMMERCIAL

## 2024-06-06 DIAGNOSIS — R51.9 INTRACTABLE HEADACHE, UNSPECIFIED CHRONICITY PATTERN, UNSPECIFIED HEADACHE TYPE: ICD-10-CM

## 2024-06-06 PROCEDURE — 70450 CT HEAD/BRAIN W/O DYE: CPT

## 2024-06-26 ENCOUNTER — APPOINTMENT (OUTPATIENT)
Dept: LAB | Facility: HOSPITAL | Age: 64
End: 2024-06-26
Payer: COMMERCIAL

## 2025-02-03 ENCOUNTER — APPOINTMENT (OUTPATIENT)
Dept: LAB | Facility: HOSPITAL | Age: 65
End: 2025-02-03
Payer: COMMERCIAL

## 2025-02-03 DIAGNOSIS — N52.9 IMPOTENCE OF ORGANIC ORIGIN: ICD-10-CM

## 2025-02-03 DIAGNOSIS — D53.9 SIMPLE CHRONIC ANEMIA: ICD-10-CM

## 2025-02-03 DIAGNOSIS — I10 ESSENTIAL HYPERTENSION, MALIGNANT: ICD-10-CM

## 2025-02-03 LAB
25(OH)D3 SERPL-MCNC: 107 NG/ML (ref 30–100)
ALBUMIN SERPL BCG-MCNC: 4.3 G/DL (ref 3.5–5)
ALP SERPL-CCNC: 55 U/L (ref 34–104)
ALT SERPL W P-5'-P-CCNC: 47 U/L (ref 7–52)
ANION GAP SERPL CALCULATED.3IONS-SCNC: 7 MMOL/L (ref 4–13)
AST SERPL W P-5'-P-CCNC: 40 U/L (ref 13–39)
BASOPHILS # BLD AUTO: 0.04 THOUSANDS/ΜL (ref 0–0.1)
BASOPHILS NFR BLD AUTO: 1 % (ref 0–1)
BILIRUB SERPL-MCNC: 2.44 MG/DL (ref 0.2–1)
BUN SERPL-MCNC: 24 MG/DL (ref 5–25)
CALCIUM SERPL-MCNC: 9.4 MG/DL (ref 8.4–10.2)
CHLORIDE SERPL-SCNC: 100 MMOL/L (ref 96–108)
CHOLEST SERPL-MCNC: 121 MG/DL (ref ?–200)
CO2 SERPL-SCNC: 29 MMOL/L (ref 21–32)
CREAT SERPL-MCNC: 1.08 MG/DL (ref 0.6–1.3)
EOSINOPHIL # BLD AUTO: 0.14 THOUSAND/ΜL (ref 0–0.61)
EOSINOPHIL NFR BLD AUTO: 2 % (ref 0–6)
ERYTHROCYTE [DISTWIDTH] IN BLOOD BY AUTOMATED COUNT: 13.1 % (ref 11.6–15.1)
EST. AVERAGE GLUCOSE BLD GHB EST-MCNC: 114 MG/DL
GFR SERPL CREATININE-BSD FRML MDRD: 73 ML/MIN/1.73SQ M
GLUCOSE P FAST SERPL-MCNC: 90 MG/DL (ref 65–99)
HBA1C MFR BLD: 5.6 %
HCT VFR BLD AUTO: 52.7 % (ref 36.5–49.3)
HDLC SERPL-MCNC: 43 MG/DL
HGB BLD-MCNC: 17 G/DL (ref 12–17)
IMM GRANULOCYTES # BLD AUTO: 0.05 THOUSAND/UL (ref 0–0.2)
IMM GRANULOCYTES NFR BLD AUTO: 1 % (ref 0–2)
LDLC SERPL CALC-MCNC: 68 MG/DL (ref 0–100)
LYMPHOCYTES # BLD AUTO: 1.07 THOUSANDS/ΜL (ref 0.6–4.47)
LYMPHOCYTES NFR BLD AUTO: 16 % (ref 14–44)
MCH RBC QN AUTO: 30.7 PG (ref 26.8–34.3)
MCHC RBC AUTO-ENTMCNC: 32.3 G/DL (ref 31.4–37.4)
MCV RBC AUTO: 95 FL (ref 82–98)
MONOCYTES # BLD AUTO: 1.13 THOUSAND/ΜL (ref 0.17–1.22)
MONOCYTES NFR BLD AUTO: 17 % (ref 4–12)
NEUTROPHILS # BLD AUTO: 4.23 THOUSANDS/ΜL (ref 1.85–7.62)
NEUTS SEG NFR BLD AUTO: 63 % (ref 43–75)
NONHDLC SERPL-MCNC: 78 MG/DL
NRBC BLD AUTO-RTO: 0 /100 WBCS
PLATELET # BLD AUTO: 175 THOUSANDS/UL (ref 149–390)
PMV BLD AUTO: 9.6 FL (ref 8.9–12.7)
POTASSIUM SERPL-SCNC: 4.4 MMOL/L (ref 3.5–5.3)
PROT SERPL-MCNC: 7.6 G/DL (ref 6.4–8.4)
RBC # BLD AUTO: 5.53 MILLION/UL (ref 3.88–5.62)
SODIUM SERPL-SCNC: 136 MMOL/L (ref 135–147)
TRIGL SERPL-MCNC: 51 MG/DL (ref ?–150)
TSH SERPL DL<=0.05 MIU/L-ACNC: 1.25 UIU/ML (ref 0.45–4.5)
WBC # BLD AUTO: 6.66 THOUSAND/UL (ref 4.31–10.16)

## 2025-02-03 PROCEDURE — 83036 HEMOGLOBIN GLYCOSYLATED A1C: CPT

## 2025-02-03 PROCEDURE — 82306 VITAMIN D 25 HYDROXY: CPT

## 2025-02-03 PROCEDURE — 36415 COLL VENOUS BLD VENIPUNCTURE: CPT

## 2025-02-03 PROCEDURE — 84443 ASSAY THYROID STIM HORMONE: CPT

## 2025-02-03 PROCEDURE — 80061 LIPID PANEL: CPT

## 2025-02-03 PROCEDURE — 80053 COMPREHEN METABOLIC PANEL: CPT

## 2025-02-03 PROCEDURE — 85025 COMPLETE CBC W/AUTO DIFF WBC: CPT

## 2025-07-29 ENCOUNTER — APPOINTMENT (OUTPATIENT)
Dept: LAB | Facility: HOSPITAL | Age: 65
End: 2025-07-29
Payer: COMMERCIAL

## 2025-07-29 DIAGNOSIS — M13.0 POLYARTHROPATHY: ICD-10-CM

## 2025-07-29 LAB
ALBUMIN SERPL BCG-MCNC: 4.7 G/DL (ref 3.5–5)
ALP SERPL-CCNC: 50 U/L (ref 34–104)
ALT SERPL W P-5'-P-CCNC: 26 U/L (ref 7–52)
ANION GAP SERPL CALCULATED.3IONS-SCNC: 10 MMOL/L (ref 4–13)
AST SERPL W P-5'-P-CCNC: 24 U/L (ref 13–39)
BASOPHILS # BLD MANUAL: 0 THOUSAND/UL (ref 0–0.1)
BASOPHILS NFR MAR MANUAL: 0 % (ref 0–1)
BILIRUB SERPL-MCNC: 1.15 MG/DL (ref 0.2–1)
BUN SERPL-MCNC: 63 MG/DL (ref 5–25)
CALCIUM SERPL-MCNC: 10.1 MG/DL (ref 8.4–10.2)
CHLORIDE SERPL-SCNC: 100 MMOL/L (ref 96–108)
CO2 SERPL-SCNC: 25 MMOL/L (ref 21–32)
CREAT SERPL-MCNC: 3.16 MG/DL (ref 0.6–1.3)
CRP SERPL QL: 5.5 MG/L
EOSINOPHIL # BLD MANUAL: 0.28 THOUSAND/UL (ref 0–0.4)
EOSINOPHIL NFR BLD MANUAL: 3 % (ref 0–6)
ERYTHROCYTE [DISTWIDTH] IN BLOOD BY AUTOMATED COUNT: 13.2 % (ref 11.6–15.1)
ERYTHROCYTE [SEDIMENTATION RATE] IN BLOOD: 16 MM/HOUR (ref 0–19)
GFR SERPL CREATININE-BSD FRML MDRD: 19 ML/MIN/1.73SQ M
GLUCOSE P FAST SERPL-MCNC: 88 MG/DL (ref 65–99)
HCT VFR BLD AUTO: 49.9 % (ref 36.5–49.3)
HGB BLD-MCNC: 17 G/DL (ref 12–17)
LYMPHOCYTES # BLD AUTO: 28 % (ref 14–44)
LYMPHOCYTES # BLD AUTO: 3.11 THOUSAND/UL (ref 0.6–4.47)
MCH RBC QN AUTO: 31.3 PG (ref 26.8–34.3)
MCHC RBC AUTO-ENTMCNC: 34.1 G/DL (ref 31.4–37.4)
MCV RBC AUTO: 92 FL (ref 82–98)
MONOCYTES # BLD AUTO: 0.75 THOUSAND/UL (ref 0–1.22)
MONOCYTES NFR BLD: 8 % (ref 4–12)
NEUTROPHILS # BLD MANUAL: 5.28 THOUSAND/UL (ref 1.85–7.62)
NEUTS SEG NFR BLD AUTO: 56 % (ref 43–75)
PLATELET # BLD AUTO: 218 THOUSANDS/UL (ref 149–390)
PLATELET BLD QL SMEAR: ADEQUATE
PMV BLD AUTO: 10.2 FL (ref 8.9–12.7)
POTASSIUM SERPL-SCNC: 3.2 MMOL/L (ref 3.5–5.3)
PROT SERPL-MCNC: 8 G/DL (ref 6.4–8.4)
RBC # BLD AUTO: 5.43 MILLION/UL (ref 3.88–5.62)
RBC MORPH BLD: NORMAL
SODIUM SERPL-SCNC: 135 MMOL/L (ref 135–147)
VARIANT LYMPHS # BLD AUTO: 5 %
WBC # BLD AUTO: 9.42 THOUSAND/UL (ref 4.31–10.16)

## 2025-07-29 PROCEDURE — 85007 BL SMEAR W/DIFF WBC COUNT: CPT

## 2025-07-29 PROCEDURE — 86618 LYME DISEASE ANTIBODY: CPT

## 2025-07-29 PROCEDURE — 80053 COMPREHEN METABOLIC PANEL: CPT

## 2025-07-29 PROCEDURE — 87468 ANAPLSMA PHGCYTOPHLM AMP PRB: CPT

## 2025-07-29 PROCEDURE — 86140 C-REACTIVE PROTEIN: CPT

## 2025-07-29 PROCEDURE — 36415 COLL VENOUS BLD VENIPUNCTURE: CPT

## 2025-07-29 PROCEDURE — 85027 COMPLETE CBC AUTOMATED: CPT

## 2025-07-29 PROCEDURE — 87798 DETECT AGENT NOS DNA AMP: CPT

## 2025-07-29 PROCEDURE — 85652 RBC SED RATE AUTOMATED: CPT

## 2025-07-30 LAB — B BURGDOR IGG+IGM SER QL IA: NEGATIVE

## 2025-08-01 ENCOUNTER — APPOINTMENT (OUTPATIENT)
Dept: LAB | Facility: HOSPITAL | Age: 65
End: 2025-08-01
Payer: COMMERCIAL

## 2025-08-01 DIAGNOSIS — N17.8 ACUTE RENAL FAILURE WITH PATHOLOGICAL LESION IN KIDNEY (HCC): ICD-10-CM

## 2025-08-01 DIAGNOSIS — R79.82 ELEVATED C-REACTIVE PROTEIN (CRP): ICD-10-CM

## 2025-08-01 LAB
ALBUMIN SERPL BCG-MCNC: 4.6 G/DL (ref 3.5–5)
ALP SERPL-CCNC: 44 U/L (ref 34–104)
ALT SERPL W P-5'-P-CCNC: 25 U/L (ref 7–52)
ANION GAP SERPL CALCULATED.3IONS-SCNC: 11 MMOL/L (ref 4–13)
AST SERPL W P-5'-P-CCNC: 29 U/L (ref 13–39)
BILIRUB SERPL-MCNC: 0.9 MG/DL (ref 0.2–1)
BUN SERPL-MCNC: 46 MG/DL (ref 5–25)
CALCIUM SERPL-MCNC: 9.6 MG/DL (ref 8.4–10.2)
CHLORIDE SERPL-SCNC: 100 MMOL/L (ref 96–108)
CO2 SERPL-SCNC: 26 MMOL/L (ref 21–32)
CREAT SERPL-MCNC: 1.42 MG/DL (ref 0.6–1.3)
CRP SERPL QL: 4.6 MG/L
ERYTHROCYTE [SEDIMENTATION RATE] IN BLOOD: 11 MM/HOUR (ref 0–19)
GFR SERPL CREATININE-BSD FRML MDRD: 52 ML/MIN/1.73SQ M
GLUCOSE SERPL-MCNC: 69 MG/DL (ref 65–140)
POTASSIUM SERPL-SCNC: 3.3 MMOL/L (ref 3.5–5.3)
PROT SERPL-MCNC: 7.7 G/DL (ref 6.4–8.4)
SODIUM SERPL-SCNC: 137 MMOL/L (ref 135–147)

## 2025-08-01 PROCEDURE — 86140 C-REACTIVE PROTEIN: CPT

## 2025-08-01 PROCEDURE — 85652 RBC SED RATE AUTOMATED: CPT

## 2025-08-01 PROCEDURE — 36415 COLL VENOUS BLD VENIPUNCTURE: CPT

## 2025-08-01 PROCEDURE — 80053 COMPREHEN METABOLIC PANEL: CPT

## 2025-08-05 LAB — MISCELLANEOUS LAB TEST RESULT: NORMAL

## 2025-08-12 ENCOUNTER — APPOINTMENT (OUTPATIENT)
Dept: LAB | Facility: HOSPITAL | Age: 65
End: 2025-08-12
Payer: COMMERCIAL